# Patient Record
Sex: FEMALE | Race: WHITE | NOT HISPANIC OR LATINO
[De-identification: names, ages, dates, MRNs, and addresses within clinical notes are randomized per-mention and may not be internally consistent; named-entity substitution may affect disease eponyms.]

---

## 2017-01-03 ENCOUNTER — LABORATORY RESULT (OUTPATIENT)
Age: 77
End: 2017-01-03

## 2017-01-03 ENCOUNTER — APPOINTMENT (OUTPATIENT)
Dept: INTERNAL MEDICINE | Facility: CLINIC | Age: 77
End: 2017-01-03

## 2017-01-03 ENCOUNTER — NON-APPOINTMENT (OUTPATIENT)
Age: 77
End: 2017-01-03

## 2017-01-03 VITALS
HEART RATE: 66 BPM | SYSTOLIC BLOOD PRESSURE: 150 MMHG | DIASTOLIC BLOOD PRESSURE: 80 MMHG | RESPIRATION RATE: 16 BRPM | WEIGHT: 140 LBS | BODY MASS INDEX: 23.9 KG/M2 | HEIGHT: 64 IN

## 2017-01-03 DIAGNOSIS — Z84.89 FAMILY HISTORY OF OTHER SPECIFIED CONDITIONS: ICD-10-CM

## 2017-01-03 DIAGNOSIS — M15.9 POLYOSTEOARTHRITIS, UNSPECIFIED: ICD-10-CM

## 2017-01-06 ENCOUNTER — RX RENEWAL (OUTPATIENT)
Age: 77
End: 2017-01-06

## 2017-01-06 LAB
25(OH)D3 SERPL-MCNC: 29.6 NG/ML
ALBUMIN SERPL ELPH-MCNC: 4.3 G/DL
ALP BLD-CCNC: 69 U/L
ALT SERPL-CCNC: 16 U/L
ANION GAP SERPL CALC-SCNC: 19 MMOL/L
APPEARANCE: ABNORMAL
AST SERPL-CCNC: 23 U/L
BASOPHILS # BLD AUTO: 0.04 K/UL
BASOPHILS NFR BLD AUTO: 0.8 %
BILIRUB SERPL-MCNC: 0.6 MG/DL
BILIRUBIN URINE: NEGATIVE
BLOOD URINE: NEGATIVE
BUN SERPL-MCNC: 22 MG/DL
CALCIUM SERPL-MCNC: 9.4 MG/DL
CHLORIDE SERPL-SCNC: 103 MMOL/L
CHOLEST SERPL-MCNC: 266 MG/DL
CHOLEST/HDLC SERPL: 3 RATIO
CO2 SERPL-SCNC: 21 MMOL/L
COLOR: YELLOW
CREAT SERPL-MCNC: 0.67 MG/DL
EOSINOPHIL # BLD AUTO: 0.17 K/UL
EOSINOPHIL NFR BLD AUTO: 3.6 %
GLUCOSE QUALITATIVE U: NORMAL MG/DL
GLUCOSE SERPL-MCNC: 86 MG/DL
HCT VFR BLD CALC: 40.1 %
HDLC SERPL-MCNC: 89 MG/DL
HGB BLD-MCNC: 13.8 G/DL
IMM GRANULOCYTES NFR BLD AUTO: 0.4 %
KETONES URINE: NEGATIVE
LDLC SERPL CALC-MCNC: 159 MG/DL
LEUKOCYTE ESTERASE URINE: ABNORMAL
LYMPHOCYTES # BLD AUTO: 1.9 K/UL
LYMPHOCYTES NFR BLD AUTO: 39.8 %
MAN DIFF?: NORMAL
MCHC RBC-ENTMCNC: 32.7 PG
MCHC RBC-ENTMCNC: 34.4 GM/DL
MCV RBC AUTO: 95 FL
MONOCYTES # BLD AUTO: 0.47 K/UL
MONOCYTES NFR BLD AUTO: 9.9 %
NEUTROPHILS # BLD AUTO: 2.17 K/UL
NEUTROPHILS NFR BLD AUTO: 45.5 %
NITRITE URINE: NEGATIVE
PH URINE: 8
PLATELET # BLD AUTO: 187 K/UL
POTASSIUM SERPL-SCNC: 4.6 MMOL/L
PROT SERPL-MCNC: 6.8 G/DL
PROTEIN URINE: NEGATIVE MG/DL
RBC # BLD: 4.22 M/UL
RBC # FLD: 14.6 %
SODIUM SERPL-SCNC: 143 MMOL/L
SPECIFIC GRAVITY URINE: 1.02
T4 SERPL-MCNC: 6.1 UG/DL
TRIGL SERPL-MCNC: 91 MG/DL
TSH SERPL-ACNC: 5.48 UIU/ML
UROBILINOGEN URINE: NORMAL MG/DL
WBC # FLD AUTO: 4.77 K/UL

## 2017-01-06 RX ORDER — CHROMIUM 200 MCG
1000 TABLET ORAL DAILY
Qty: 90 | Refills: 1 | Status: ACTIVE | COMMUNITY
Start: 2017-01-06

## 2017-04-17 ENCOUNTER — NON-APPOINTMENT (OUTPATIENT)
Age: 77
End: 2017-04-17

## 2017-04-17 ENCOUNTER — APPOINTMENT (OUTPATIENT)
Dept: INTERNAL MEDICINE | Facility: CLINIC | Age: 77
End: 2017-04-17

## 2017-04-17 VITALS — DIASTOLIC BLOOD PRESSURE: 80 MMHG | SYSTOLIC BLOOD PRESSURE: 130 MMHG

## 2017-04-17 DIAGNOSIS — Z78.9 OTHER SPECIFIED HEALTH STATUS: ICD-10-CM

## 2017-04-17 RX ORDER — FEXOFENADINE HYDROCHLORIDE 180 MG/1
180 TABLET, FILM COATED ORAL DAILY
Refills: 0 | Status: ACTIVE | COMMUNITY
Start: 2017-04-17

## 2017-04-17 RX ORDER — OMEPRAZOLE MAGNESIUM 20 MG/1
20 TABLET, DELAYED RELEASE ORAL DAILY
Qty: 60 | Refills: 0 | Status: DISCONTINUED | COMMUNITY
Start: 2017-01-03 | End: 2017-04-17

## 2017-04-17 RX ORDER — ALPRAZOLAM 0.25 MG/1
0.25 TABLET ORAL
Qty: 60 | Refills: 0 | Status: ACTIVE | COMMUNITY
Start: 2017-04-17

## 2017-04-19 ENCOUNTER — RESULT REVIEW (OUTPATIENT)
Age: 77
End: 2017-04-19

## 2017-04-19 LAB
APPEARANCE: CLEAR
BACTERIA UR CULT: NORMAL
BACTERIA: NEGATIVE
BILIRUBIN URINE: NEGATIVE
BLOOD URINE: NEGATIVE
CALCIUM OXALATE CRYSTALS: ABNORMAL
COLOR: YELLOW
GLUCOSE QUALITATIVE U: NORMAL MG/DL
HYALINE CASTS: 0 /LPF
KETONES URINE: NEGATIVE
LEUKOCYTE ESTERASE URINE: ABNORMAL
MICROSCOPIC-UA: NORMAL
NITRITE URINE: NEGATIVE
PH URINE: 6
PROTEIN URINE: NEGATIVE MG/DL
RED BLOOD CELLS URINE: 1 /HPF
SPECIFIC GRAVITY URINE: 1.02
SQUAMOUS EPITHELIAL CELLS: 3 /HPF
UROBILINOGEN URINE: NORMAL MG/DL
WHITE BLOOD CELLS URINE: 7 /HPF

## 2017-05-14 ENCOUNTER — EMERGENCY (EMERGENCY)
Facility: HOSPITAL | Age: 77
LOS: 1 days | Discharge: HOME | End: 2017-05-14
Attending: EMERGENCY MEDICINE | Admitting: EMERGENCY MEDICINE
Payer: MEDICARE

## 2017-05-14 VITALS
RESPIRATION RATE: 18 BRPM | HEART RATE: 70 BPM | TEMPERATURE: 98 F | DIASTOLIC BLOOD PRESSURE: 74 MMHG | OXYGEN SATURATION: 97 % | SYSTOLIC BLOOD PRESSURE: 150 MMHG

## 2017-05-14 VITALS
TEMPERATURE: 98 F | RESPIRATION RATE: 18 BRPM | OXYGEN SATURATION: 100 % | SYSTOLIC BLOOD PRESSURE: 167 MMHG | DIASTOLIC BLOOD PRESSURE: 90 MMHG | HEART RATE: 62 BPM

## 2017-05-14 PROCEDURE — 73562 X-RAY EXAM OF KNEE 3: CPT | Mod: 26,RT

## 2017-05-14 PROCEDURE — 99285 EMERGENCY DEPT VISIT HI MDM: CPT | Mod: GC

## 2017-05-14 RX ORDER — MORPHINE SULFATE 50 MG/1
4 CAPSULE, EXTENDED RELEASE ORAL ONCE
Qty: 0 | Refills: 0 | Status: DISCONTINUED | OUTPATIENT
Start: 2017-05-14 | End: 2017-05-14

## 2017-05-14 RX ADMIN — MORPHINE SULFATE 4 MILLIGRAM(S): 50 CAPSULE, EXTENDED RELEASE ORAL at 22:32

## 2017-05-14 NOTE — ED ADULT TRIAGE NOTE - CHIEF COMPLAINT QUOTE
pt BIBA from home, pt c/o right knee pain s/p sliding out of her chair.  pt is s/p right knee replacement 4/27/17.

## 2017-05-14 NOTE — ED ADULT NURSE NOTE - OBJECTIVE STATEMENT
Alert and oriented x 4. Pt received to spot 7 complaining of right knee pain and swelling. Pt states," I had right knee replacement on April 27,2017.Everything was fine. Today while moving from the table, I felt a tear and stabbing pain." Pain 7/10, no redness or drainage. Pt Knee is swollen. VSS.  at bedside. RN facilitator. Will continue to monitor.

## 2017-05-14 NOTE — ED PROVIDER NOTE - PHYSICAL EXAMINATION
VS:  unremarkable    GEN -mod distress R knee pain A+O x3   HEAD - NC/AT     ENT - PEERL, EOMI, mucous membranes  moist , no discharge      NECK: Neck supple, non-tender without lymphadenopathy, no masses, no JVD  PULM - CTA b/l,  symmetric breath sounds  COR -  normal heart sounds    ABD - , ND, NT, soft, no guarding, no rebound, no masses    BACK - no CVA tenderness, nontender spine     EXTREMS - no edema, no deformity, warm and well perfused; except R knee - R knee large saggital incision c/d/i.  Swelling and ttp diffusely over anterior knee - R knee effusion mod - large.  Hip and foot/ankle nontender.  R knee decr ROM due to pain.  No pus discharge.  Distal N/V/T/I.  SKIN - no rash or bruising      NEUROLOGIC - alert, CN 2-12 intact, sensation nl, motor 5/5 RUE/LUE/RLE/LLE.      IMP:

## 2017-05-14 NOTE — ED PROVIDER NOTE - OBJECTIVE STATEMENT
76F p/w sudden severe R knee pain x 1 day. Pt was seated in a chair and scooted it forward with her knee bent and planted on the floor and then felt a sudden severe pain in the anterior R knee.  No direct blow or fall.  Now unable to walk on it.  Swelling and redness to knee has increased since the injury today.  It was only mildly swollen before.  Pt had been able to walk and put weight on it prior to the event, was doing PT.  PT had TKR 4/27/17.  No hip pain, no foot pain, no fever.  Took Oxycodone 4 h ago.  Pmhx:  GERD, anxiety, Allergies  PShx:  shoulder replacement, appx  no T  PCN intolerance (diarrhea 40 ya), no allergies.

## 2017-05-14 NOTE — ED PROVIDER NOTE - MEDICAL DECISION MAKING DETAILS
76F p/w sudden severe R knee pain while it was flexed and scooting with the chair.  Possible periprosthetic fracture.  Ortho at [facility] Jose F Priest.  Will rx pain meds and check preop - labs, check R knee xray.  To be discussed with her ortho.

## 2017-05-14 NOTE — ED PROVIDER NOTE - PROGRESS NOTE DETAILS
Urbano CARBAJAL: Paged Dr. Jose F Priest via answering service at [phone] and [phone], still awaiting callback. Urbano CARBAJAL: D/w Dr. Priest, states he has very low suspicion for hardware loosening, he was concerned for quad tendon or patellar tendon rupture but given that pt can now straight leg raise, pt can go home, knee immobilizer for comfort, and he can see pt in office on Monday or Wed.

## 2017-05-14 NOTE — ED PROVIDER NOTE - PSH
Fracture  repair of right knee with pinning  H/O abdominoplasty    H/O bilateral breast reduction surgery    H/O shoulder replacement  right 6/2012  S/P appendectomy    S/P cataract extraction    S/P tonsillectomy

## 2017-05-15 RX ORDER — OXYCODONE HYDROCHLORIDE 5 MG/1
10 TABLET ORAL ONCE
Qty: 0 | Refills: 0 | Status: DISCONTINUED | OUTPATIENT
Start: 2017-05-15 | End: 2017-05-15

## 2017-05-15 RX ORDER — MORPHINE SULFATE 50 MG/1
4 CAPSULE, EXTENDED RELEASE ORAL ONCE
Qty: 0 | Refills: 0 | Status: DISCONTINUED | OUTPATIENT
Start: 2017-05-15 | End: 2017-05-15

## 2017-05-15 RX ADMIN — MORPHINE SULFATE 4 MILLIGRAM(S): 50 CAPSULE, EXTENDED RELEASE ORAL at 00:24

## 2017-05-15 RX ADMIN — OXYCODONE HYDROCHLORIDE 10 MILLIGRAM(S): 5 TABLET ORAL at 01:08

## 2017-05-15 RX ADMIN — MORPHINE SULFATE 4 MILLIGRAM(S): 50 CAPSULE, EXTENDED RELEASE ORAL at 00:48

## 2017-05-15 RX ADMIN — MORPHINE SULFATE 4 MILLIGRAM(S): 50 CAPSULE, EXTENDED RELEASE ORAL at 00:19

## 2017-07-25 ENCOUNTER — APPOINTMENT (OUTPATIENT)
Dept: INTERNAL MEDICINE | Facility: CLINIC | Age: 77
End: 2017-07-25

## 2018-04-10 ENCOUNTER — LABORATORY RESULT (OUTPATIENT)
Age: 78
End: 2018-04-10

## 2018-04-10 ENCOUNTER — NON-APPOINTMENT (OUTPATIENT)
Age: 78
End: 2018-04-10

## 2018-04-10 ENCOUNTER — APPOINTMENT (OUTPATIENT)
Dept: INTERNAL MEDICINE | Facility: CLINIC | Age: 78
End: 2018-04-10
Payer: MEDICARE

## 2018-04-10 VITALS
DIASTOLIC BLOOD PRESSURE: 80 MMHG | BODY MASS INDEX: 24.8 KG/M2 | SYSTOLIC BLOOD PRESSURE: 120 MMHG | WEIGHT: 140 LBS | RESPIRATION RATE: 16 BRPM | HEART RATE: 72 BPM | HEIGHT: 63 IN

## 2018-04-10 DIAGNOSIS — Z78.9 OTHER SPECIFIED HEALTH STATUS: ICD-10-CM

## 2018-04-10 PROCEDURE — 99497 ADVNCD CARE PLAN 30 MIN: CPT | Mod: 33

## 2018-04-10 PROCEDURE — 36415 COLL VENOUS BLD VENIPUNCTURE: CPT

## 2018-04-10 PROCEDURE — 93000 ELECTROCARDIOGRAM COMPLETE: CPT

## 2018-04-10 PROCEDURE — G0439: CPT

## 2018-04-10 RX ORDER — VALACYCLOVIR HYDROCHLORIDE 500 MG/1
500 TABLET, FILM COATED ORAL DAILY
Refills: 0 | Status: ACTIVE | COMMUNITY
Start: 2018-04-10

## 2018-04-12 ENCOUNTER — TRANSCRIPTION ENCOUNTER (OUTPATIENT)
Age: 78
End: 2018-04-12

## 2018-04-12 LAB
25(OH)D3 SERPL-MCNC: 41.2 NG/ML
ALBUMIN SERPL ELPH-MCNC: 4.2 G/DL
ALP BLD-CCNC: 71 U/L
ALT SERPL-CCNC: 20 U/L
ANION GAP SERPL CALC-SCNC: 13 MMOL/L
APPEARANCE: ABNORMAL
APTT BLD: 35.6 SEC
AST SERPL-CCNC: 22 U/L
BASOPHILS # BLD AUTO: 0.03 K/UL
BASOPHILS NFR BLD AUTO: 0.6 %
BILIRUB SERPL-MCNC: 0.5 MG/DL
BILIRUBIN URINE: NEGATIVE
BLOOD URINE: NEGATIVE
BUN SERPL-MCNC: 28 MG/DL
CALCIUM SERPL-MCNC: 9.9 MG/DL
CHLORIDE SERPL-SCNC: 103 MMOL/L
CHOLEST SERPL-MCNC: 242 MG/DL
CHOLEST/HDLC SERPL: 3.4 RATIO
CO2 SERPL-SCNC: 26 MMOL/L
COLOR: YELLOW
CREAT SERPL-MCNC: 0.75 MG/DL
CRP SERPL-MCNC: 0.2 MG/DL
EOSINOPHIL # BLD AUTO: 0.17 K/UL
EOSINOPHIL NFR BLD AUTO: 3.3 %
ERYTHROCYTE [SEDIMENTATION RATE] IN BLOOD BY WESTERGREN METHOD: 8 MM/HR
GLUCOSE QUALITATIVE U: NEGATIVE MG/DL
GLUCOSE SERPL-MCNC: 88 MG/DL
HCT VFR BLD CALC: 41.7 %
HDLC SERPL-MCNC: 71 MG/DL
HGB BLD-MCNC: 14.6 G/DL
IMM GRANULOCYTES NFR BLD AUTO: 0.2 %
INR PPP: 1.04 RATIO
KETONES URINE: ABNORMAL
LDLC SERPL CALC-MCNC: 136 MG/DL
LEUKOCYTE ESTERASE URINE: ABNORMAL
LYMPHOCYTES # BLD AUTO: 2.05 K/UL
LYMPHOCYTES NFR BLD AUTO: 40 %
MAN DIFF?: NORMAL
MCHC RBC-ENTMCNC: 33.7 PG
MCHC RBC-ENTMCNC: 35 GM/DL
MCV RBC AUTO: 96.3 FL
MONOCYTES # BLD AUTO: 0.39 K/UL
MONOCYTES NFR BLD AUTO: 7.6 %
NEUTROPHILS # BLD AUTO: 2.47 K/UL
NEUTROPHILS NFR BLD AUTO: 48.3 %
NITRITE URINE: NEGATIVE
PH URINE: 6
PLATELET # BLD AUTO: 229 K/UL
POTASSIUM SERPL-SCNC: 4.8 MMOL/L
PROT SERPL-MCNC: 7.3 G/DL
PROTEIN URINE: NEGATIVE MG/DL
PT BLD: 11.8 SEC
RBC # BLD: 4.33 M/UL
RBC # FLD: 13.3 %
SODIUM SERPL-SCNC: 142 MMOL/L
SPECIFIC GRAVITY URINE: 1.02
T4 SERPL-MCNC: 5.7 UG/DL
TRIGL SERPL-MCNC: 176 MG/DL
TSH SERPL-ACNC: 4 UIU/ML
UROBILINOGEN URINE: NEGATIVE MG/DL
WBC # FLD AUTO: 5.12 K/UL

## 2018-05-22 ENCOUNTER — NON-APPOINTMENT (OUTPATIENT)
Age: 78
End: 2018-05-22

## 2018-05-22 ENCOUNTER — APPOINTMENT (OUTPATIENT)
Dept: INTERNAL MEDICINE | Facility: CLINIC | Age: 78
End: 2018-05-22
Payer: MEDICARE

## 2018-05-22 VITALS
RESPIRATION RATE: 16 BRPM | HEART RATE: 76 BPM | WEIGHT: 149 LBS | BODY MASS INDEX: 26.39 KG/M2 | DIASTOLIC BLOOD PRESSURE: 82 MMHG | SYSTOLIC BLOOD PRESSURE: 120 MMHG

## 2018-05-22 PROCEDURE — 93000 ELECTROCARDIOGRAM COMPLETE: CPT

## 2018-05-22 PROCEDURE — 99214 OFFICE O/P EST MOD 30 MIN: CPT | Mod: 25

## 2018-05-22 PROCEDURE — 36415 COLL VENOUS BLD VENIPUNCTURE: CPT

## 2018-05-22 NOTE — HISTORY OF PRESENT ILLNESS
[( Patient denies any chest pain, claudication, dyspnea on exertion, orthopnea, palpitations or syncope )] : Patient denies any chest pain, claudication, dyspnea on exertion, orthopnea, palpitations or syncope. [Coronary Artery Disease] : no coronary artery disease [Diabetes] : no diabetes [Sleep Apnea] : no sleep apnea [COPD] : no COPD [Previous Adverse Anesthesia Reaction] : no previous adverse anesthesia reaction [FreeTextEntry1] : Left shoulder replacement [FreeTextEntry2] : June 13, 2018 [FreeTextEntry3] : Dr. Abhay Graham

## 2018-05-22 NOTE — ASSESSMENT
[Patient Optimized for Surgery] : Patient optimized for surgery [No Further Testing Recommended] : no further testing recommended [FreeTextEntry4] : Pt is an acceptable candidate for planned surgery.\par Continue meds post-op

## 2018-05-22 NOTE — CONSULT LETTER
[Dear  ___] : Dear  [unfilled], [Consult Letter:] : I had the pleasure of evaluating your patient, [unfilled]. [Please see my note below.] : Please see my note below. [Consult Closing:] : Thank you very much for allowing me to participate in the care of this patient.  If you have any questions, please do not hesitate to contact me. [Sincerely,] : Sincerely, [FreeTextEntry1] : Pre-Op Evaluation [FreeTextEntry3] : Cristino Bernard MD

## 2018-05-22 NOTE — PHYSICAL EXAM
[No Acute Distress] : no acute distress [Well Nourished] : well nourished [Well Developed] : well developed [Well-Appearing] : well-appearing [Normal Sclera/Conjunctiva] : normal sclera/conjunctiva [PERRL] : pupils equal round and reactive to light [Normal Oropharynx] : the oropharynx was normal [No JVD] : no jugular venous distention [Supple] : supple [No Lymphadenopathy] : no lymphadenopathy [Thyroid Normal, No Nodules] : the thyroid was normal and there were no nodules present [No Respiratory Distress] : no respiratory distress  [Clear to Auscultation] : lungs were clear to auscultation bilaterally [No Accessory Muscle Use] : no accessory muscle use [Normal Rate] : normal rate  [Regular Rhythm] : with a regular rhythm [Normal S1, S2] : normal S1 and S2 [No Murmur] : no murmur heard [No Carotid Bruits] : no carotid bruits [No Edema] : there was no peripheral edema [Soft] : abdomen soft [Non Tender] : non-tender [Non-distended] : non-distended [No Masses] : no abdominal mass palpated [No HSM] : no HSM [Normal Bowel Sounds] : normal bowel sounds [Normal Supraclavicular Nodes] : no supraclavicular lymphadenopathy [Normal Posterior Cervical Nodes] : no posterior cervical lymphadenopathy [Normal Anterior Cervical Nodes] : no anterior cervical lymphadenopathy [No CVA Tenderness] : no CVA  tenderness [No Spinal Tenderness] : no spinal tenderness [No Focal Deficits] : no focal deficits [Alert and Oriented x3] : oriented to person, place, and time

## 2018-05-24 LAB
ALBUMIN SERPL ELPH-MCNC: 4.2 G/DL
ALP BLD-CCNC: 74 U/L
ALT SERPL-CCNC: 20 U/L
ANION GAP SERPL CALC-SCNC: 10 MMOL/L
APTT BLD: 31.5 SEC
AST SERPL-CCNC: 23 U/L
BASOPHILS # BLD AUTO: 0.04 K/UL
BASOPHILS NFR BLD AUTO: 0.6 %
BILIRUB SERPL-MCNC: 0.6 MG/DL
BUN SERPL-MCNC: 22 MG/DL
CALCIUM SERPL-MCNC: 9.4 MG/DL
CHLORIDE SERPL-SCNC: 101 MMOL/L
CO2 SERPL-SCNC: 28 MMOL/L
CREAT SERPL-MCNC: 0.66 MG/DL
CRP SERPL-MCNC: 0.2 MG/DL
EOSINOPHIL # BLD AUTO: 0.13 K/UL
EOSINOPHIL NFR BLD AUTO: 2.1 %
ERYTHROCYTE [SEDIMENTATION RATE] IN BLOOD BY WESTERGREN METHOD: 13 MM/HR
GLUCOSE SERPL-MCNC: 84 MG/DL
HCT VFR BLD CALC: 42 %
HGB BLD-MCNC: 14.2 G/DL
IMM GRANULOCYTES NFR BLD AUTO: 0.2 %
INR PPP: 0.99 RATIO
LYMPHOCYTES # BLD AUTO: 2.15 K/UL
LYMPHOCYTES NFR BLD AUTO: 34.9 %
MAN DIFF?: NORMAL
MCHC RBC-ENTMCNC: 32.6 PG
MCHC RBC-ENTMCNC: 33.8 GM/DL
MCV RBC AUTO: 96.6 FL
MONOCYTES # BLD AUTO: 0.53 K/UL
MONOCYTES NFR BLD AUTO: 8.6 %
NEUTROPHILS # BLD AUTO: 3.3 K/UL
NEUTROPHILS NFR BLD AUTO: 53.6 %
PLATELET # BLD AUTO: 189 K/UL
POTASSIUM SERPL-SCNC: 4.3 MMOL/L
PROT SERPL-MCNC: 6.9 G/DL
PT BLD: 11.2 SEC
RBC # BLD: 4.35 M/UL
RBC # FLD: 13.1 %
SODIUM SERPL-SCNC: 139 MMOL/L
WBC # FLD AUTO: 6.16 K/UL

## 2018-05-25 ENCOUNTER — TRANSCRIPTION ENCOUNTER (OUTPATIENT)
Age: 78
End: 2018-05-25

## 2018-11-01 ENCOUNTER — NON-APPOINTMENT (OUTPATIENT)
Age: 78
End: 2018-11-01

## 2018-11-01 ENCOUNTER — APPOINTMENT (OUTPATIENT)
Dept: CARDIOLOGY | Facility: CLINIC | Age: 78
End: 2018-11-01
Payer: MEDICARE

## 2018-11-01 VITALS
SYSTOLIC BLOOD PRESSURE: 138 MMHG | BODY MASS INDEX: 26.39 KG/M2 | WEIGHT: 149 LBS | DIASTOLIC BLOOD PRESSURE: 90 MMHG | OXYGEN SATURATION: 97 % | HEART RATE: 71 BPM

## 2018-11-01 VITALS — DIASTOLIC BLOOD PRESSURE: 80 MMHG | SYSTOLIC BLOOD PRESSURE: 130 MMHG

## 2018-11-01 PROCEDURE — 93000 ELECTROCARDIOGRAM COMPLETE: CPT

## 2018-11-01 PROCEDURE — 99204 OFFICE O/P NEW MOD 45 MIN: CPT

## 2018-11-08 ENCOUNTER — APPOINTMENT (OUTPATIENT)
Dept: CARDIOLOGY | Facility: CLINIC | Age: 78
End: 2018-11-08

## 2018-11-16 ENCOUNTER — APPOINTMENT (OUTPATIENT)
Dept: CARDIOLOGY | Facility: CLINIC | Age: 78
End: 2018-11-16
Payer: MEDICARE

## 2018-11-16 PROCEDURE — 93320 DOPPLER ECHO COMPLETE: CPT

## 2018-11-16 PROCEDURE — 93351 STRESS TTE COMPLETE: CPT

## 2018-11-16 PROCEDURE — 93325 DOPPLER ECHO COLOR FLOW MAPG: CPT

## 2019-04-23 ENCOUNTER — APPOINTMENT (OUTPATIENT)
Dept: INTERNAL MEDICINE | Facility: CLINIC | Age: 79
End: 2019-04-23
Payer: MEDICARE

## 2019-04-23 ENCOUNTER — NON-APPOINTMENT (OUTPATIENT)
Age: 79
End: 2019-04-23

## 2019-04-23 VITALS
WEIGHT: 153 LBS | HEART RATE: 70 BPM | RESPIRATION RATE: 16 BRPM | BODY MASS INDEX: 27.11 KG/M2 | HEIGHT: 63 IN | DIASTOLIC BLOOD PRESSURE: 90 MMHG | SYSTOLIC BLOOD PRESSURE: 130 MMHG

## 2019-04-23 PROCEDURE — 93000 ELECTROCARDIOGRAM COMPLETE: CPT | Mod: 59

## 2019-04-23 PROCEDURE — 36415 COLL VENOUS BLD VENIPUNCTURE: CPT

## 2019-04-23 PROCEDURE — 99497 ADVNCD CARE PLAN 30 MIN: CPT | Mod: 33

## 2019-04-23 PROCEDURE — G0439: CPT

## 2019-04-23 PROCEDURE — 99213 OFFICE O/P EST LOW 20 MIN: CPT | Mod: 25

## 2019-04-23 NOTE — REVIEW OF SYSTEMS
[Hearing Loss] : hearing loss [Negative] : Heme/Lymph [FreeTextEntry3] : last optho - 2018 [de-identified] : sees psych [de-identified] : sees derm [FreeTextEntry9] : BENNY

## 2019-04-23 NOTE — HEALTH RISK ASSESSMENT
[Good] : ~his/her~  mood as  good [No falls in past year] : Patient reported no falls in the past year [With Family] : lives with family [None] : None [Retired] : retired [Fully functional (bathing, dressing, toileting, transferring, walking, feeding)] : Fully functional (bathing, dressing, toileting, transferring, walking, feeding) [] :  [Feels Safe at Home] : Feels safe at home [Carbon Monoxide Detector] : carbon monoxide detector [Fully functional (using the telephone, shopping, preparing meals, housekeeping, doing laundry, using] : Fully functional and needs no help or supervision to perform IADLs (using the telephone, shopping, preparing meals, housekeeping, doing laundry, using transportation, managing medications and managing finances) [Smoke Detector] : smoke detector [Sunscreen] : uses sunscreen [Seat Belt] :  uses seat belt [With Patient/Caregiver] : With Patient/Caregiver [Designated Healthcare Proxy] : Designated healthcare proxy [Name: ___] : Health Care Proxy's Name: [unfilled]  [] : No [de-identified] : Socially [AdvancecareDate] : 04/19 [de-identified] : GYM 1-2 x per week [FreeTextEntry4] : Had long discussion with the patient.\par Discussed with pt the need for a health care proxy.\par Discussed who can be a proxy, forms given if needed, and the need for the proxy to know their wishes and to make sure they will comply.\par The proxy will need to have a copy in their home and the patient should have a copy.\par \par

## 2019-04-23 NOTE — HISTORY OF PRESENT ILLNESS
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

## 2019-04-23 NOTE — ASSESSMENT
[FreeTextEntry1] : Pt with above medical issues which requires extra work in addition to the well visit.\par Labs sent\par Meds to be adjusted\par To watch salt in diet as BP borderline.\par Health counseling given.

## 2019-04-23 NOTE — PHYSICAL EXAM
[No Acute Distress] : no acute distress [Well Nourished] : well nourished [Well Developed] : well developed [Well-Appearing] : well-appearing [Normal Sclera/Conjunctiva] : normal sclera/conjunctiva [PERRL] : pupils equal round and reactive to light [EOMI] : extraocular movements intact [Normal Outer Ear/Nose] : the outer ears and nose were normal in appearance [Normal Oropharynx] : the oropharynx was normal [Normal TMs] : both tympanic membranes were normal [No JVD] : no jugular venous distention [Supple] : supple [Thyroid Normal, No Nodules] : the thyroid was normal and there were no nodules present [No Lymphadenopathy] : no lymphadenopathy [No Respiratory Distress] : no respiratory distress  [Clear to Auscultation] : lungs were clear to auscultation bilaterally [No Accessory Muscle Use] : no accessory muscle use [Normal S1, S2] : normal S1 and S2 [Regular Rhythm] : with a regular rhythm [Normal Rate] : normal rate  [No Carotid Bruits] : no carotid bruits [No Murmur] : no murmur heard [Pedal Pulses Present] : the pedal pulses are present [No Varicosities] : no varicosities [No Abdominal Bruit] : a ~M bruit was not heard ~T in the abdomen [No Extremity Clubbing/Cyanosis] : no extremity clubbing/cyanosis [No Edema] : there was no peripheral edema [No Nipple Discharge] : no nipple discharge [No Palpable Aorta] : no palpable aorta [No Axillary Lymphadenopathy] : no axillary lymphadenopathy [Soft] : abdomen soft [Non Tender] : non-tender [Non-distended] : non-distended [No Masses] : no abdominal mass palpated [No HSM] : no HSM [Normal Bowel Sounds] : normal bowel sounds [Normal Axillary Nodes] : no axillary lymphadenopathy [Normal Supraclavicular Nodes] : no supraclavicular lymphadenopathy [No CVA Tenderness] : no CVA  tenderness [Normal Posterior Cervical Nodes] : no posterior cervical lymphadenopathy [Normal Anterior Cervical Nodes] : no anterior cervical lymphadenopathy [No Spinal Tenderness] : no spinal tenderness [Grossly Normal Strength/Tone] : grossly normal strength/tone [No Joint Swelling] : no joint swelling [Normal Gait] : normal gait [No Rash] : no rash [Coordination Grossly Intact] : coordination grossly intact [No Focal Deficits] : no focal deficits [Speech Grossly Normal] : speech grossly normal [Deep Tendon Reflexes (DTR)] : deep tendon reflexes were 2+ and symmetric [Memory Grossly Normal] : memory grossly normal [Normal Affect] : the affect was normal [Alert and Oriented x3] : oriented to person, place, and time [Normal Mood] : the mood was normal [Normal Insight/Judgement] : insight and judgment were intact [de-identified] : SP bilateral reduction [FreeTextEntry1] : Deferred to GYN [de-identified] : Deferred to GYN

## 2019-04-25 LAB
25(OH)D3 SERPL-MCNC: 37.4 NG/ML
ALBUMIN SERPL ELPH-MCNC: 4.4 G/DL
ALP BLD-CCNC: 76 U/L
ALT SERPL-CCNC: 21 U/L
ANION GAP SERPL CALC-SCNC: 12 MMOL/L
APPEARANCE: CLEAR
AST SERPL-CCNC: 18 U/L
BASOPHILS # BLD AUTO: 0.04 K/UL
BASOPHILS NFR BLD AUTO: 0.7 %
BILIRUB SERPL-MCNC: 0.5 MG/DL
BILIRUBIN URINE: NEGATIVE
BLOOD URINE: NEGATIVE
BUN SERPL-MCNC: 23 MG/DL
CALCIUM SERPL-MCNC: 9.6 MG/DL
CHLORIDE SERPL-SCNC: 101 MMOL/L
CHOLEST SERPL-MCNC: 234 MG/DL
CHOLEST/HDLC SERPL: 3.3 RATIO
CO2 SERPL-SCNC: 28 MMOL/L
COLOR: NORMAL
CREAT SERPL-MCNC: 0.58 MG/DL
EOSINOPHIL # BLD AUTO: 0.12 K/UL
EOSINOPHIL NFR BLD AUTO: 2 %
ESTIMATED AVERAGE GLUCOSE: 94 MG/DL
GLUCOSE QUALITATIVE U: NEGATIVE
GLUCOSE SERPL-MCNC: 87 MG/DL
HBA1C MFR BLD HPLC: 4.9 %
HCT VFR BLD CALC: 42.8 %
HDLC SERPL-MCNC: 70 MG/DL
HGB BLD-MCNC: 14.5 G/DL
IMM GRANULOCYTES NFR BLD AUTO: 0.2 %
KETONES URINE: NEGATIVE
LDLC SERPL CALC-MCNC: 142 MG/DL
LEUKOCYTE ESTERASE URINE: NEGATIVE
LYMPHOCYTES # BLD AUTO: 2.11 K/UL
LYMPHOCYTES NFR BLD AUTO: 35.7 %
MAN DIFF?: NORMAL
MCHC RBC-ENTMCNC: 32.5 PG
MCHC RBC-ENTMCNC: 33.9 GM/DL
MCV RBC AUTO: 96 FL
MONOCYTES # BLD AUTO: 0.48 K/UL
MONOCYTES NFR BLD AUTO: 8.1 %
NEUTROPHILS # BLD AUTO: 3.15 K/UL
NEUTROPHILS NFR BLD AUTO: 53.3 %
NITRITE URINE: NEGATIVE
PH URINE: 7
PLATELET # BLD AUTO: 207 K/UL
POTASSIUM SERPL-SCNC: 4.5 MMOL/L
PROT SERPL-MCNC: 6.8 G/DL
PROTEIN URINE: NEGATIVE
RBC # BLD: 4.46 M/UL
RBC # FLD: 12.9 %
SODIUM SERPL-SCNC: 140 MMOL/L
SPECIFIC GRAVITY URINE: 1.01
T4 SERPL-MCNC: 6.2 UG/DL
TRIGL SERPL-MCNC: 111 MG/DL
TSH SERPL-ACNC: 6.05 UIU/ML
UROBILINOGEN URINE: NORMAL
VIT B12 SERPL-MCNC: 965 PG/ML
WBC # FLD AUTO: 5.91 K/UL

## 2019-05-10 ENCOUNTER — APPOINTMENT (OUTPATIENT)
Dept: UROLOGY | Facility: CLINIC | Age: 79
End: 2019-05-10
Payer: MEDICARE

## 2019-05-10 VITALS
SYSTOLIC BLOOD PRESSURE: 136 MMHG | WEIGHT: 148 LBS | HEIGHT: 63.5 IN | BODY MASS INDEX: 25.9 KG/M2 | RESPIRATION RATE: 16 BRPM | HEART RATE: 76 BPM | DIASTOLIC BLOOD PRESSURE: 90 MMHG

## 2019-05-10 DIAGNOSIS — Z84.1 FAMILY HISTORY OF DISORDERS OF KIDNEY AND URETER: ICD-10-CM

## 2019-05-10 PROCEDURE — 99204 OFFICE O/P NEW MOD 45 MIN: CPT

## 2019-05-10 NOTE — REVIEW OF SYSTEMS
[Recent Weight Loss (___ Lbs)] : recent [unfilled] ~Ulb weight loss [Date of last menstrual period ____] : date of last menstrual period: [unfilled] [Sexually Transmitted Disease (STD)] : sexually transmitted disease [Urine retention] : urine retention [Seen by urologist before (Name)  ___] : Previously seen by a urologist: [unfilled] [Wake up at night to urinate  How many times?  ___] : wakes up to urinate [unfilled] times during the night [Strong urge to urinate] : strong urge to urinate [Bladder pressure] : experiences bladder pressure [Strain or push to urinate] : strain or push to urinate [Bladder fullness after urinating] : bladder fullness after urinating [Negative] : Endocrine [FreeTextEntry1] : herpes [FreeTextEntry6] : frequent urination 10-12 times

## 2019-05-10 NOTE — PHYSICAL EXAM
[General Appearance - Well Developed] : well developed [General Appearance - Well Nourished] : well nourished [General Appearance - In No Acute Distress] : no acute distress [Edema] : no peripheral edema [Exaggerated Use Of Accessory Muscles For Inspiration] : no accessory muscle use [Abdomen Soft] : soft [Urethral Meatus] : normal urethra [Urinary Bladder Findings] : the bladder was normal on palpation [External Female Genitalia] : normal external genitalia [Vagina] : normal vaginal exam [Normal Station and Gait] : the gait and station were normal for the patient's age [Skin Color & Pigmentation] : normal skin color and pigmentation [No Focal Deficits] : no focal deficits [Oriented To Time, Place, And Person] : oriented to person, place, and time [Cervical Lymph Nodes Enlarged Anterior Bilaterally] : anterior cervical [Supraclavicular Lymph Nodes Enlarged Bilaterally] : supraclavicular [FreeTextEntry1] : mod atrophy,

## 2019-05-10 NOTE — ASSESSMENT
[FreeTextEntry1] : --Renal US to eval for stone. \par --oxybutynin\par --caffeinie limit\par --rtc in 1mo

## 2019-05-10 NOTE — HISTORY OF PRESENT ILLNESS
[FreeTextEntry1] : 79yo female with cc of urinary frequency. SHe reports bothersome urinary frequency both day and night. SHe is going 20 times she states in the day. SHe will do some readjusting and get a little more out. This occurs more at night. Nocturia x3 (can't go more than 2y). SHe endorses urinary urgency. Occasional urinary urgency. Wears a pad for safety. SHe does not leak daily. No straining. Occasional feelings of incomplete emptying. No issues with UTIs. \par \par Drinks a lot of water and ensure. Decaf coffee in the am and 3x per week has 2 cups of coffee playing bridge. No sensation of prolapse. Occasional issues with constipation. \par \par Pt had been seen by  in the past and reports she was told she had "a stone in ureter". SHe had lithotripsy and had her sx resolve. Suspect this was ureteral stone. SHe did not have pain. No other hx of stones.

## 2019-05-10 NOTE — HISTORY OF PRESENT ILLNESS
[FreeTextEntry1] : 77yo female with cc of urinary frequency. SHe reports bothersome urinary frequency both day and night. SHe is going 20 times she states in the day. SHe will do some readjusting and get a little more out. This occurs more at night. Nocturia x3 (can't go more than 2y). SHe endorses urinary urgency. Occasional urinary urgency. Wears a pad for safety. SHe does not leak daily. No straining. Occasional feelings of incomplete emptying. No issues with UTIs. \par \par Drinks a lot of water and ensure. Decaf coffee in the am and 3x per week has 2 cups of coffee playing bridge. No sensation of prolapse. Occasional issues with constipation. \par \par Pt had been seen by  in the past and reports she was told she had "a stone in ureter". SHe had lithotripsy and had her sx resolve. Suspect this was ureteral stone. SHe did not have pain. No other hx of stones.

## 2019-05-14 ENCOUNTER — APPOINTMENT (OUTPATIENT)
Dept: CARDIOLOGY | Facility: CLINIC | Age: 79
End: 2019-05-14
Payer: MEDICARE

## 2019-05-14 VITALS — SYSTOLIC BLOOD PRESSURE: 135 MMHG | DIASTOLIC BLOOD PRESSURE: 85 MMHG

## 2019-05-14 VITALS
HEART RATE: 62 BPM | WEIGHT: 151 LBS | TEMPERATURE: 98.4 F | BODY MASS INDEX: 26.75 KG/M2 | OXYGEN SATURATION: 97 % | DIASTOLIC BLOOD PRESSURE: 111 MMHG | SYSTOLIC BLOOD PRESSURE: 163 MMHG | HEIGHT: 63 IN

## 2019-05-14 PROCEDURE — 99214 OFFICE O/P EST MOD 30 MIN: CPT

## 2019-05-14 NOTE — HISTORY OF PRESENT ILLNESS
[FreeTextEntry1] : 78-year-old female who was seen last fall for atypical chest pain and dyspnea on exertion. Her physical was unremarkable.  She has felt generally well over the winter and is no longer having exertional complaints. She continues to struggle with her weight.

## 2019-05-14 NOTE — PHYSICAL EXAM
[General Appearance - Well Developed] : well developed [Normal Appearance] : normal appearance [Well Groomed] : well groomed [No Deformities] : no deformities [General Appearance - Well Nourished] : well nourished [General Appearance - In No Acute Distress] : no acute distress [Eyelids - No Xanthelasma] : the eyelids demonstrated no xanthelasmas [Normal Conjunctiva] : the conjunctiva exhibited no abnormalities [Normal Oral Mucosa] : normal oral mucosa [No Oral Pallor] : no oral pallor [No Oral Cyanosis] : no oral cyanosis [Normal Jugular Venous A Waves Present] : normal jugular venous A waves present [Normal Jugular Venous V Waves Present] : normal jugular venous V waves present [No Jugular Venous Tenorio A Waves] : no jugular venous tenorio A waves [Exaggerated Use Of Accessory Muscles For Inspiration] : no accessory muscle use [Respiration, Rhythm And Depth] : normal respiratory rhythm and effort [Auscultation Breath Sounds / Voice Sounds] : lungs were clear to auscultation bilaterally [Heart Sounds] : normal S1 and S2 [Heart Rate And Rhythm] : heart rate and rhythm were normal [Murmurs] : no murmurs present [Abdomen Soft] : soft [Abdomen Tenderness] : non-tender [Abdomen Mass (___ Cm)] : no abdominal mass palpated [Abnormal Walk] : normal gait [Gait - Sufficient For Exercise Testing] : the gait was sufficient for exercise testing [Cyanosis, Localized] : no localized cyanosis [Nail Clubbing] : no clubbing of the fingernails [Petechial Hemorrhages (___cm)] : no petechial hemorrhages [Skin Color & Pigmentation] : normal skin color and pigmentation [] : no rash [No Venous Stasis] : no venous stasis [No Skin Ulcers] : no skin ulcer [Skin Lesions] : no skin lesions [No Xanthoma] : no  xanthoma was observed [Oriented To Time, Place, And Person] : oriented to person, place, and time [Affect] : the affect was normal [Mood] : the mood was normal [No Anxiety] : not feeling anxious

## 2019-05-14 NOTE — DISCUSSION/SUMMARY
[FreeTextEntry1] : Mrs. Oreilly has no complaints and looks unchanged. Her exam is remarkable for no weight change, normal repeat blood pressure, clear lungs, and a normal cardiac exam. Her EKG done at her internist's office recently is within normal limits. I made no changes in her regimen. She will followup here on a p.r.n. basis.

## 2019-05-17 RX ORDER — PANTOPRAZOLE 40 MG/1
40 TABLET, DELAYED RELEASE ORAL
Qty: 30 | Refills: 1 | Status: DISCONTINUED | COMMUNITY
Start: 2017-04-17 | End: 2019-05-17

## 2019-05-24 ENCOUNTER — APPOINTMENT (OUTPATIENT)
Dept: INTERNAL MEDICINE | Facility: CLINIC | Age: 79
End: 2019-05-24
Payer: MEDICARE

## 2019-05-24 VITALS
WEIGHT: 150 LBS | BODY MASS INDEX: 26.58 KG/M2 | HEIGHT: 63 IN | TEMPERATURE: 97.7 F | SYSTOLIC BLOOD PRESSURE: 120 MMHG | DIASTOLIC BLOOD PRESSURE: 70 MMHG

## 2019-05-24 DIAGNOSIS — Z01.818 ENCOUNTER FOR OTHER PREPROCEDURAL EXAMINATION: ICD-10-CM

## 2019-05-24 PROCEDURE — 99213 OFFICE O/P EST LOW 20 MIN: CPT

## 2019-05-24 RX ORDER — ESCITALOPRAM OXALATE 5 MG/1
TABLET, FILM COATED ORAL
Refills: 0 | Status: DISCONTINUED | COMMUNITY
End: 2019-05-24

## 2019-06-03 ENCOUNTER — APPOINTMENT (OUTPATIENT)
Dept: INTERNAL MEDICINE | Facility: CLINIC | Age: 79
End: 2019-06-03
Payer: MEDICARE

## 2019-06-03 PROCEDURE — 99214 OFFICE O/P EST MOD 30 MIN: CPT

## 2019-06-04 VITALS — DIASTOLIC BLOOD PRESSURE: 70 MMHG | TEMPERATURE: 97.6 F | SYSTOLIC BLOOD PRESSURE: 120 MMHG

## 2019-06-10 ENCOUNTER — APPOINTMENT (OUTPATIENT)
Dept: INTERNAL MEDICINE | Facility: CLINIC | Age: 79
End: 2019-06-10
Payer: MEDICARE

## 2019-06-10 VITALS — SYSTOLIC BLOOD PRESSURE: 140 MMHG | DIASTOLIC BLOOD PRESSURE: 70 MMHG | TEMPERATURE: 97.4 F

## 2019-06-10 PROCEDURE — 99213 OFFICE O/P EST LOW 20 MIN: CPT

## 2019-06-14 ENCOUNTER — APPOINTMENT (OUTPATIENT)
Dept: UROLOGY | Facility: CLINIC | Age: 79
End: 2019-06-14
Payer: MEDICARE

## 2019-06-14 ENCOUNTER — OUTPATIENT (OUTPATIENT)
Dept: OUTPATIENT SERVICES | Facility: HOSPITAL | Age: 79
LOS: 1 days | End: 2019-06-14
Payer: MEDICARE

## 2019-06-14 ENCOUNTER — TRANSCRIPTION ENCOUNTER (OUTPATIENT)
Age: 79
End: 2019-06-14

## 2019-06-14 DIAGNOSIS — N20.0 CALCULUS OF KIDNEY: ICD-10-CM

## 2019-06-14 DIAGNOSIS — R35.0 FREQUENCY OF MICTURITION: ICD-10-CM

## 2019-06-14 PROCEDURE — 76775 US EXAM ABDO BACK WALL LIM: CPT

## 2019-06-14 PROCEDURE — 76775 US EXAM ABDO BACK WALL LIM: CPT | Mod: 26

## 2019-06-14 PROCEDURE — 99214 OFFICE O/P EST MOD 30 MIN: CPT | Mod: 25

## 2019-06-14 RX ORDER — MIRABEGRON 25 MG/1
25 TABLET, FILM COATED, EXTENDED RELEASE ORAL
Qty: 30 | Refills: 11 | Status: DISCONTINUED | COMMUNITY
Start: 2019-05-17 | End: 2019-06-14

## 2019-06-14 RX ORDER — DOXYCYCLINE HYCLATE 100 MG/1
100 TABLET ORAL
Qty: 14 | Refills: 0 | Status: DISCONTINUED | COMMUNITY
Start: 2019-06-03 | End: 2019-06-14

## 2019-06-14 NOTE — PHYSICAL EXAM
[General Appearance - In No Acute Distress] : no acute distress [General Appearance - Well Developed] : well developed [General Appearance - Well Nourished] : well nourished [Abdomen Soft] : soft [Skin Color & Pigmentation] : normal skin color and pigmentation [Edema] : no peripheral edema [Exaggerated Use Of Accessory Muscles For Inspiration] : no accessory muscle use [Oriented To Time, Place, And Person] : oriented to person, place, and time [Normal Station and Gait] : the gait and station were normal for the patient's age [No Focal Deficits] : no focal deficits

## 2019-06-14 NOTE — REVIEW OF SYSTEMS
[Sexually Transmitted Disease (STD)] : sexually transmitted disease [Recent Weight Loss (___ Lbs)] : recent [unfilled] ~Ulb weight loss [Date of last menstrual period ____] : date of last menstrual period: [unfilled] [Seen by urologist before (Name)  ___] : Previously seen by a urologist: [unfilled] [Urine retention] : urine retention [Wake up at night to urinate  How many times?  ___] : wakes up to urinate [unfilled] times during the night [Strong urge to urinate] : strong urge to urinate [Bladder pressure] : experiences bladder pressure [Bladder fullness after urinating] : bladder fullness after urinating [Strain or push to urinate] : strain or push to urinate [Negative] : Heme/Lymph [FreeTextEntry6] : frequent urination 10-12 times [FreeTextEntry1] : herpes

## 2019-06-14 NOTE — ASSESSMENT
[FreeTextEntry1] : Kidney stones. B and non-obstructing. \par --FLuid intake\par --Renal US in 1y\par \par Urinary sx. Improvement with oxybutynin. \par --Increase oxybutynin to 10mg\par --Limit caffeine \par

## 2019-06-14 NOTE — HISTORY OF PRESENT ILLNESS
[FreeTextEntry1] : 79yo female with cc of urinary frequency. SHe reports bothersome urinary frequency both day and night. SHe is going 20 times she states in the day. SHe will do some readjusting and get a little more out. This occurs more at night. Nocturia x3-4 (can't go more than 2h). SHe endorses urinary urgency. Occasional urinary urgency. Wears a pad for safety. SHe does not leak daily. No straining. Occasional feelings of incomplete emptying. No issues with UTIs. Was started on oxybutynin. Reports that nocturia decreased to 2x. Daytime frequency better too. She had bother from dry mouth but is now using biotin spray. \par \par Drinks a lot of water and ensure. Decaf coffee in the am and 3x per week has 2 cups of coffee playing bridge. No sensation of prolapse. Occasional issues with constipation. \par \par Pt had been seen by  in the past and reports she was told she had "a stone in ureter". SHe had lithotripsy and had her sx resolve. Suspect this was ureteral stone. SHe did not have pain. No other hx of stones. Underwent US today that shows B non obstructing stones. [facility] [facility] 8-10mm.

## 2019-06-18 PROBLEM — R35.0 INCREASED URINARY FREQUENCY: Status: ACTIVE | Noted: 2019-05-10

## 2019-06-24 DIAGNOSIS — N20.0 CALCULUS OF KIDNEY: ICD-10-CM

## 2019-07-18 ENCOUNTER — RESULT REVIEW (OUTPATIENT)
Age: 79
End: 2019-07-18

## 2019-07-18 ENCOUNTER — APPOINTMENT (OUTPATIENT)
Dept: GASTROENTEROLOGY | Facility: CLINIC | Age: 79
End: 2019-07-18
Payer: MEDICARE

## 2019-07-18 VITALS
WEIGHT: 153 LBS | BODY MASS INDEX: 27.11 KG/M2 | TEMPERATURE: 98.2 F | DIASTOLIC BLOOD PRESSURE: 80 MMHG | HEART RATE: 74 BPM | HEIGHT: 63 IN | OXYGEN SATURATION: 98 % | SYSTOLIC BLOOD PRESSURE: 130 MMHG

## 2019-07-18 PROCEDURE — 99203 OFFICE O/P NEW LOW 30 MIN: CPT

## 2019-07-24 NOTE — PHYSICAL EXAM
[General Appearance - Alert] : alert [General Appearance - In No Acute Distress] : in no acute distress [General Appearance - Well Nourished] : well nourished [General Appearance - Well Developed] : well developed [Sclera] : the sclera and conjunctiva were normal [Neck Appearance] : the appearance of the neck was normal [Neck Cervical Mass (___cm)] : no neck mass was observed [Jugular Venous Distention Increased] : there was no jugular-venous distention [Exaggerated Use Of Accessory Muscles For Inspiration] : no accessory muscle use [Heart Sounds] : normal S1 and S2 [Bowel Sounds] : normal bowel sounds [Abdomen Soft] : soft [Abdomen Tenderness] : non-tender [Cervical Lymph Nodes Enlarged Posterior Bilaterally] : posterior cervical [Cervical Lymph Nodes Enlarged Anterior Bilaterally] : anterior cervical [No CVA Tenderness] : no ~M costovertebral angle tenderness [No Spinal Tenderness] : no spinal tenderness [Nail Clubbing] : no clubbing  or cyanosis of the fingernails [] : no rash [No Focal Deficits] : no focal deficits [Oriented To Time, Place, And Person] : oriented to person, place, and time [Impaired Insight] : insight and judgment were intact [Affect] : the affect was normal

## 2019-07-24 NOTE — HISTORY OF PRESENT ILLNESS
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 The extent of Mendoza's is not well described in past endoscopy reports, but it seems that she has a short segment of Mendoza's and a possible hiatus hernia. 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 She has mild GERD symptoms managed on PPI. She denies dysphagia, chest pain or back pain. No hematemesis. No dyspepsia, bloating, epigastric pain. No change in bowel habits, blood in stool, bloating or other alterations in stool.\par No weight loss and appetite is stable. \par

## 2019-07-24 NOTE — ASSESSMENT
[FreeTextEntry1] : Plan for EGD, possible endomicroscopy and either ablation, resection with myself and Dr. Kennedy

## 2019-09-10 ENCOUNTER — OUTPATIENT (OUTPATIENT)
Dept: OUTPATIENT SERVICES | Facility: HOSPITAL | Age: 79
LOS: 1 days | End: 2019-09-10
Payer: MEDICARE

## 2019-09-10 VITALS
DIASTOLIC BLOOD PRESSURE: 80 MMHG | RESPIRATION RATE: 16 BRPM | SYSTOLIC BLOOD PRESSURE: 140 MMHG | HEART RATE: 67 BPM | TEMPERATURE: 98 F | HEIGHT: 63 IN | WEIGHT: 151.9 LBS | OXYGEN SATURATION: 98 %

## 2019-09-10 DIAGNOSIS — K22.70 BARRETT'S ESOPHAGUS WITHOUT DYSPLASIA: ICD-10-CM

## 2019-09-10 DIAGNOSIS — K22.711 BARRETT'S ESOPHAGUS WITH HIGH GRADE DYSPLASIA: ICD-10-CM

## 2019-09-10 DIAGNOSIS — Z98.890 OTHER SPECIFIED POSTPROCEDURAL STATES: Chronic | ICD-10-CM

## 2019-09-10 LAB
ALBUMIN SERPL ELPH-MCNC: 4.2 G/DL — SIGNIFICANT CHANGE UP (ref 3.3–5)
ALP SERPL-CCNC: 75 U/L — SIGNIFICANT CHANGE UP (ref 40–120)
ALT FLD-CCNC: 18 U/L — SIGNIFICANT CHANGE UP (ref 10–45)
ANION GAP SERPL CALC-SCNC: 12 MMOL/L — SIGNIFICANT CHANGE UP (ref 5–17)
AST SERPL-CCNC: 19 U/L — SIGNIFICANT CHANGE UP (ref 10–40)
BILIRUB SERPL-MCNC: 0.7 MG/DL — SIGNIFICANT CHANGE UP (ref 0.2–1.2)
BUN SERPL-MCNC: 19 MG/DL — SIGNIFICANT CHANGE UP (ref 7–23)
CALCIUM SERPL-MCNC: 9.6 MG/DL — SIGNIFICANT CHANGE UP (ref 8.4–10.5)
CHLORIDE SERPL-SCNC: 100 MMOL/L — SIGNIFICANT CHANGE UP (ref 96–108)
CO2 SERPL-SCNC: 27 MMOL/L — SIGNIFICANT CHANGE UP (ref 22–31)
CREAT SERPL-MCNC: 0.56 MG/DL — SIGNIFICANT CHANGE UP (ref 0.5–1.3)
GLUCOSE SERPL-MCNC: 87 MG/DL — SIGNIFICANT CHANGE UP (ref 70–99)
HCT VFR BLD CALC: 41.7 % — SIGNIFICANT CHANGE UP (ref 34.5–45)
HGB BLD-MCNC: 14.6 G/DL — SIGNIFICANT CHANGE UP (ref 11.5–15.5)
MCHC RBC-ENTMCNC: 33.3 PG — SIGNIFICANT CHANGE UP (ref 27–34)
MCHC RBC-ENTMCNC: 35 GM/DL — SIGNIFICANT CHANGE UP (ref 32–36)
MCV RBC AUTO: 95.2 FL — SIGNIFICANT CHANGE UP (ref 80–100)
PLATELET # BLD AUTO: 197 K/UL — SIGNIFICANT CHANGE UP (ref 150–400)
POTASSIUM SERPL-MCNC: 4.2 MMOL/L — SIGNIFICANT CHANGE UP (ref 3.5–5.3)
POTASSIUM SERPL-SCNC: 4.2 MMOL/L — SIGNIFICANT CHANGE UP (ref 3.5–5.3)
PROT SERPL-MCNC: 6.8 G/DL — SIGNIFICANT CHANGE UP (ref 6–8.3)
RBC # BLD: 4.38 M/UL — SIGNIFICANT CHANGE UP (ref 3.8–5.2)
RBC # FLD: 12.6 % — SIGNIFICANT CHANGE UP (ref 10.3–14.5)
SODIUM SERPL-SCNC: 139 MMOL/L — SIGNIFICANT CHANGE UP (ref 135–145)
WBC # BLD: 6.67 K/UL — SIGNIFICANT CHANGE UP (ref 3.8–10.5)
WBC # FLD AUTO: 6.67 K/UL — SIGNIFICANT CHANGE UP (ref 3.8–10.5)

## 2019-09-10 PROCEDURE — G0463: CPT

## 2019-09-10 PROCEDURE — 80053 COMPREHEN METABOLIC PANEL: CPT

## 2019-09-10 PROCEDURE — 85027 COMPLETE CBC AUTOMATED: CPT

## 2019-09-10 NOTE — H&P PST ADULT - DOES THIS PATIENT HAVE A HISTORY OF OR HAS BEEN DX WITH HEART FAILURE?
lisinopril-hydrochlorothiazide (PRINZIDE,ZESTORETIC) 10-12 5 MG per tablet      [facility]      [phone] ANY QUESTIONS no

## 2019-09-10 NOTE — H&P PST ADULT - NSICDXPASTMEDICALHX_GEN_ALL_CORE_FT
PAST MEDICAL HISTORY:  Anxiety     Depression     Murmur, cardiac PAST MEDICAL HISTORY:  Anxiety and depression medically managed. Never Hospitalized.    History of Mendoza's esophagus dx: ' 2012    Murmur, cardiac mild    Osteoarthritis PAST MEDICAL HISTORY:  Anxiety and depression medically managed. Never Hospitalized.    GERD (gastroesophageal reflux disease)     History of Mendoza's esophagus dx: ' 2012    Murmur, cardiac     Osteoarthritis

## 2019-09-10 NOTE — H&P PST ADULT - NSICDXPASTSURGICALHX_GEN_ALL_CORE_FT
PAST SURGICAL HISTORY:  Fracture repair of right knee with pinning    H/O abdominoplasty     H/O bilateral breast reduction surgery     H/O shoulder replacement right 6/2012    S/P appendectomy     S/P cataract extraction     S/P tonsillectomy PAST SURGICAL HISTORY:  Fracture ' 80's    repair of right knee with pinning    H/O abdominoplasty ' 80's    H/O bilateral breast reduction surgery ' 80's    H/O shoulder replacement right 6/2012        Left ' 2017    S/P appendectomy age 8    S/P cataract extraction '10   Bilateral    S/P tonsillectomy age 1 PAST SURGICAL HISTORY:  Fracture ' 80's    repair of right knee with pinning    H/O abdominoplasty ' 80's    H/O bilateral breast reduction surgery ' 80's    H/O shoulder replacement right 6/2012        Left ' 2017    S/P appendectomy age 8    S/P cataract extraction '10   Bilateral    S/P cosmetic plastic surgery Face    S/P tonsillectomy age 1

## 2019-09-10 NOTE — H&P PST ADULT - HISTORY OF PRESENT ILLNESS
This is an 79 year old female with PMH: Osteoarthtis: s./p ( '12 and '17): Bilateral Shoulder Replacements/ s/p (6/2018): Right TKR. Anxiety/ Depression: medically managed: never hospitalized, dx: ('12): Barretts Esophagus: recent biopsies  dx: High Grade Dysplasia. Scheduled: EGD/ VLE with Anesthesia.

## 2019-09-18 ENCOUNTER — OUTPATIENT (OUTPATIENT)
Dept: OUTPATIENT SERVICES | Facility: HOSPITAL | Age: 79
LOS: 1 days | End: 2019-09-18
Payer: MEDICARE

## 2019-09-18 ENCOUNTER — APPOINTMENT (OUTPATIENT)
Dept: GASTROENTEROLOGY | Facility: HOSPITAL | Age: 79
End: 2019-09-18

## 2019-09-18 ENCOUNTER — RESULT REVIEW (OUTPATIENT)
Age: 79
End: 2019-09-18

## 2019-09-18 DIAGNOSIS — K22.711 BARRETT'S ESOPHAGUS WITH HIGH GRADE DYSPLASIA: ICD-10-CM

## 2019-09-18 DIAGNOSIS — Z98.890 OTHER SPECIFIED POSTPROCEDURAL STATES: Chronic | ICD-10-CM

## 2019-09-18 PROCEDURE — 88305 TISSUE EXAM BY PATHOLOGIST: CPT | Mod: 26

## 2019-09-18 PROCEDURE — 43239 EGD BIOPSY SINGLE/MULTIPLE: CPT

## 2019-09-18 PROCEDURE — 88305 TISSUE EXAM BY PATHOLOGIST: CPT

## 2019-09-18 PROCEDURE — 45390 COLONOSCOPY W/RESECTION: CPT

## 2019-09-18 PROCEDURE — 43239 EGD BIOPSY SINGLE/MULTIPLE: CPT | Mod: GC,59

## 2019-09-18 PROCEDURE — 43252 EGD OPTICAL ENDOMICROSCOPY: CPT | Mod: GC,59

## 2019-09-18 PROCEDURE — 43254 EGD ENDO MUCOSAL RESECTION: CPT | Mod: GC

## 2019-09-18 RX ORDER — DIPHENHYDRAMINE HYDROCHLORIDE AND LIDOCAINE HYDROCHLORIDE AND ALUMINUM HYDROXIDE AND MAGNESIUM HYDRO
KIT EVERY 8 HOURS
Qty: 1 | Refills: 3 | Status: ACTIVE | COMMUNITY
Start: 2019-09-18 | End: 1900-01-01

## 2019-09-18 RX ORDER — SUCRALFATE 1 G/10ML
1 SUSPENSION ORAL 3 TIMES DAILY
Qty: 2 | Refills: 5 | Status: DISCONTINUED | COMMUNITY
Start: 2019-09-18 | End: 2019-09-18

## 2019-09-25 ENCOUNTER — CHART COPY (OUTPATIENT)
Age: 79
End: 2019-09-25

## 2019-09-26 PROBLEM — M19.90 UNSPECIFIED OSTEOARTHRITIS, UNSPECIFIED SITE: Chronic | Status: ACTIVE | Noted: 2019-09-10

## 2019-09-26 PROBLEM — Z87.19 PERSONAL HISTORY OF OTHER DISEASES OF THE DIGESTIVE SYSTEM: Chronic | Status: ACTIVE | Noted: 2019-09-10

## 2019-09-26 PROBLEM — K21.9 GASTRO-ESOPHAGEAL REFLUX DISEASE WITHOUT ESOPHAGITIS: Chronic | Status: ACTIVE | Noted: 2019-09-10

## 2019-09-26 PROBLEM — F41.9 ANXIETY DISORDER, UNSPECIFIED: Chronic | Status: ACTIVE | Noted: 2019-09-10

## 2019-10-07 DIAGNOSIS — M79.10 MYALGIA, UNSPECIFIED SITE: ICD-10-CM

## 2019-10-21 ENCOUNTER — APPOINTMENT (OUTPATIENT)
Dept: INTERNAL MEDICINE | Facility: CLINIC | Age: 79
End: 2019-10-21
Payer: MEDICARE

## 2019-10-21 VITALS
HEART RATE: 70 BPM | HEIGHT: 63 IN | WEIGHT: 153 LBS | DIASTOLIC BLOOD PRESSURE: 100 MMHG | SYSTOLIC BLOOD PRESSURE: 170 MMHG | BODY MASS INDEX: 27.11 KG/M2 | TEMPERATURE: 98.4 F | RESPIRATION RATE: 16 BRPM

## 2019-10-21 DIAGNOSIS — R42 DIZZINESS AND GIDDINESS: ICD-10-CM

## 2019-10-21 PROCEDURE — 36415 COLL VENOUS BLD VENIPUNCTURE: CPT

## 2019-10-21 PROCEDURE — 99214 OFFICE O/P EST MOD 30 MIN: CPT | Mod: 25

## 2019-10-21 RX ORDER — BUSPIRONE HYDROCHLORIDE 7.5 MG/1
TABLET ORAL
Refills: 0 | Status: DISCONTINUED | COMMUNITY
End: 2019-10-21

## 2019-10-21 RX ORDER — OXYBUTYNIN CHLORIDE 10 MG/1
10 TABLET, EXTENDED RELEASE ORAL
Qty: 30 | Refills: 11 | Status: DISCONTINUED | COMMUNITY
Start: 2019-05-10 | End: 2019-10-21

## 2019-10-21 RX ORDER — AZITHROMYCIN 250 MG/1
250 TABLET, FILM COATED ORAL
Qty: 1 | Refills: 0 | Status: DISCONTINUED | COMMUNITY
Start: 2019-06-03 | End: 2019-10-21

## 2019-10-21 NOTE — ASSESSMENT
[FreeTextEntry1] : Pt lightheaded and with significant hypertension.\par Labs sent\par To cut out salt\par To start Amlodipine 5 mg per day\par FU 1 week

## 2019-10-21 NOTE — HISTORY OF PRESENT ILLNESS
[FreeTextEntry1] : FU for feeling occasionally lightheaded. Pain in left thigh - -seeing PT\par Trying to lose weight.\par No chest pains\par Breathing OK\par No BRBPR, No melena.

## 2019-10-21 NOTE — REVIEW OF SYSTEMS
[Negative] : Gastrointestinal [Chest Pain] : no chest pain [Palpitations] : no palpitations [Shortness Of Breath] : no shortness of breath

## 2019-10-22 LAB
ALBUMIN SERPL ELPH-MCNC: 4.4 G/DL
ALP BLD-CCNC: 83 U/L
ALT SERPL-CCNC: 16 U/L
ANION GAP SERPL CALC-SCNC: 14 MMOL/L
APPEARANCE: CLEAR
AST SERPL-CCNC: 17 U/L
BASOPHILS # BLD AUTO: 0.06 K/UL
BASOPHILS NFR BLD AUTO: 0.8 %
BILIRUB SERPL-MCNC: 0.5 MG/DL
BILIRUBIN URINE: NEGATIVE
BLOOD URINE: NEGATIVE
BUN SERPL-MCNC: 29 MG/DL
CALCIUM SERPL-MCNC: 9.9 MG/DL
CHLORIDE SERPL-SCNC: 100 MMOL/L
CO2 SERPL-SCNC: 25 MMOL/L
COLOR: COLORLESS
CREAT SERPL-MCNC: 0.57 MG/DL
EOSINOPHIL # BLD AUTO: 0.16 K/UL
EOSINOPHIL NFR BLD AUTO: 2.1 %
ESTIMATED AVERAGE GLUCOSE: 94 MG/DL
GLUCOSE QUALITATIVE U: NEGATIVE
GLUCOSE SERPL-MCNC: 88 MG/DL
HBA1C MFR BLD HPLC: 4.9 %
HCT VFR BLD CALC: 44.7 %
HGB BLD-MCNC: 14.8 G/DL
IMM GRANULOCYTES NFR BLD AUTO: 0.4 %
KETONES URINE: NEGATIVE
LEUKOCYTE ESTERASE URINE: NEGATIVE
LYMPHOCYTES # BLD AUTO: 2.29 K/UL
LYMPHOCYTES NFR BLD AUTO: 30.6 %
MAN DIFF?: NORMAL
MCHC RBC-ENTMCNC: 32.5 PG
MCHC RBC-ENTMCNC: 33.1 GM/DL
MCV RBC AUTO: 98 FL
MONOCYTES # BLD AUTO: 0.51 K/UL
MONOCYTES NFR BLD AUTO: 6.8 %
NEUTROPHILS # BLD AUTO: 4.44 K/UL
NEUTROPHILS NFR BLD AUTO: 59.3 %
NITRITE URINE: NEGATIVE
PH URINE: 7
PLATELET # BLD AUTO: 229 K/UL
POTASSIUM SERPL-SCNC: 4.6 MMOL/L
PROT SERPL-MCNC: 6.9 G/DL
PROTEIN URINE: NEGATIVE
RBC # BLD: 4.56 M/UL
RBC # FLD: 12.9 %
SODIUM SERPL-SCNC: 139 MMOL/L
SPECIFIC GRAVITY URINE: 1.01
T4 SERPL-MCNC: 5.8 UG/DL
TSH SERPL-ACNC: 6.53 UIU/ML
UROBILINOGEN URINE: NORMAL
WBC # FLD AUTO: 7.49 K/UL

## 2019-10-29 ENCOUNTER — APPOINTMENT (OUTPATIENT)
Dept: INTERNAL MEDICINE | Facility: CLINIC | Age: 79
End: 2019-10-29
Payer: MEDICARE

## 2019-10-29 VITALS
RESPIRATION RATE: 16 BRPM | SYSTOLIC BLOOD PRESSURE: 140 MMHG | HEART RATE: 76 BPM | BODY MASS INDEX: 26.93 KG/M2 | HEIGHT: 63 IN | DIASTOLIC BLOOD PRESSURE: 85 MMHG | WEIGHT: 152 LBS

## 2019-10-29 PROCEDURE — 99213 OFFICE O/P EST LOW 20 MIN: CPT

## 2019-10-29 NOTE — PHYSICAL EXAM
[Normal Sclera/Conjunctiva] : normal sclera/conjunctiva [Normal Oropharynx] : the oropharynx was normal [No JVD] : no jugular venous distention [No Lymphadenopathy] : no lymphadenopathy [Supple] : supple [Normal] : normal rate, regular rhythm, normal S1 and S2 and no murmur heard [No Edema] : there was no peripheral edema [Soft] : abdomen soft [Non Tender] : non-tender [No HSM] : no HSM

## 2019-10-29 NOTE — HISTORY OF PRESENT ILLNESS
[FreeTextEntry1] : FU for hypertension - on Amlodipine\par Watching salt\par Doing some water aerobics.\par

## 2019-11-05 ENCOUNTER — APPOINTMENT (OUTPATIENT)
Dept: CHRONIC DISEASE MANAGEMENT | Facility: CLINIC | Age: 79
End: 2019-11-05
Payer: MEDICARE

## 2019-11-05 PROCEDURE — 97802 MEDICAL NUTRITION INDIV IN: CPT

## 2019-11-12 ENCOUNTER — APPOINTMENT (OUTPATIENT)
Dept: INTERNAL MEDICINE | Facility: CLINIC | Age: 79
End: 2019-11-12
Payer: MEDICARE

## 2019-11-12 VITALS — SYSTOLIC BLOOD PRESSURE: 130 MMHG | DIASTOLIC BLOOD PRESSURE: 80 MMHG | RESPIRATION RATE: 16 BRPM | HEART RATE: 72 BPM

## 2019-11-12 PROCEDURE — 99213 OFFICE O/P EST LOW 20 MIN: CPT

## 2019-11-12 NOTE — PHYSICAL EXAM
[Normal Sclera/Conjunctiva] : normal sclera/conjunctiva [Normal Oropharynx] : the oropharynx was normal [No JVD] : no jugular venous distention [No Lymphadenopathy] : no lymphadenopathy [Supple] : supple [Normal] : normal rate, regular rhythm, normal S1 and S2 and no murmur heard [Non Tender] : non-tender [No Edema] : there was no peripheral edema [Soft] : abdomen soft [Non-distended] : non-distended [No HSM] : no HSM [Normal Supraclavicular Nodes] : no supraclavicular lymphadenopathy [Normal Posterior Cervical Nodes] : no posterior cervical lymphadenopathy [Normal Anterior Cervical Nodes] : no anterior cervical lymphadenopathy

## 2019-11-18 ENCOUNTER — OUTPATIENT (OUTPATIENT)
Dept: OUTPATIENT SERVICES | Facility: HOSPITAL | Age: 79
LOS: 1 days | End: 2019-11-18
Payer: MEDICARE

## 2019-11-18 ENCOUNTER — APPOINTMENT (OUTPATIENT)
Dept: GASTROENTEROLOGY | Facility: HOSPITAL | Age: 79
End: 2019-11-18

## 2019-11-18 DIAGNOSIS — Z98.890 OTHER SPECIFIED POSTPROCEDURAL STATES: Chronic | ICD-10-CM

## 2019-11-18 DIAGNOSIS — K22.711 BARRETT'S ESOPHAGUS WITH HIGH GRADE DYSPLASIA: ICD-10-CM

## 2019-11-18 DIAGNOSIS — Z01.818 ENCOUNTER FOR OTHER PREPROCEDURAL EXAMINATION: ICD-10-CM

## 2019-11-18 PROCEDURE — 43270 EGD LESION ABLATION: CPT

## 2019-11-18 PROCEDURE — 43236 UPPR GI SCOPE W/SUBMUC INJ: CPT | Mod: GC,59

## 2019-11-18 PROCEDURE — 43270 EGD LESION ABLATION: CPT | Mod: GC

## 2020-01-20 NOTE — H&P PST ADULT - EXTREMITIES
Assessment/Plan:      Diagnoses and all orders for this visit:    Constipation, unspecified constipation type  -     Ambulatory referral to Pediatric Gastroenterology; Future          Subjective:     Patient ID: Bonifacio Chirinos is a 1 y o  male  He has problem with constipation for one year   Review of Systems   Constitutional: Negative  HENT: Negative  Eyes: Negative  Respiratory: Negative  Cardiovascular: Negative  Gastrointestinal: Positive for constipation  Endocrine: Negative  Genitourinary: Negative  Musculoskeletal: Negative  Negative for arthralgias  Skin: Negative  Allergic/Immunologic: Negative  Neurological: Negative  Hematological: Negative  Psychiatric/Behavioral: Negative  Objective:     Physical Exam   Constitutional: He appears well-developed and well-nourished  He is active  HENT:   Right Ear: Tympanic membrane normal    Left Ear: Tympanic membrane normal    Nose: Nose normal    Mouth/Throat: Mucous membranes are moist  Dentition is normal  Oropharynx is clear  Eyes: Pupils are equal, round, and reactive to light  Conjunctivae and EOM are normal    Neck: Normal range of motion  Neck supple  Cardiovascular: Normal rate, regular rhythm, S1 normal and S2 normal    Pulmonary/Chest: Effort normal and breath sounds normal    Abdominal: Soft  He exhibits no distension, no mass and no abnormal umbilicus  No surgical scars  There is no hepatosplenomegaly, splenomegaly or hepatomegaly  There is no tenderness  There is no rigidity, no rebound and no guarding  No hernia  Genitourinary: Penis normal    Musculoskeletal: Normal range of motion  Neurological: He is alert  Skin: Skin is warm  Capillary refill takes less than 2 seconds  Nursing note and vitals reviewed 
No cyanosis, clubbing or edema

## 2020-03-02 ENCOUNTER — APPOINTMENT (OUTPATIENT)
Dept: GASTROENTEROLOGY | Facility: HOSPITAL | Age: 80
End: 2020-03-02

## 2020-03-02 ENCOUNTER — OUTPATIENT (OUTPATIENT)
Dept: OUTPATIENT SERVICES | Facility: HOSPITAL | Age: 80
LOS: 1 days | Discharge: HOME | End: 2020-03-02
Payer: MEDICARE

## 2020-03-02 DIAGNOSIS — Z98.890 OTHER SPECIFIED POSTPROCEDURAL STATES: Chronic | ICD-10-CM

## 2020-03-02 DIAGNOSIS — K22.711 BARRETT'S ESOPHAGUS WITH HIGH GRADE DYSPLASIA: ICD-10-CM

## 2020-03-02 PROCEDURE — 43236 UPPR GI SCOPE W/SUBMUC INJ: CPT | Mod: GC,59

## 2020-03-02 PROCEDURE — 43270 EGD LESION ABLATION: CPT

## 2020-03-02 PROCEDURE — 43270 EGD LESION ABLATION: CPT | Mod: GC

## 2020-08-10 ENCOUNTER — APPOINTMENT (OUTPATIENT)
Dept: INTERNAL MEDICINE | Facility: CLINIC | Age: 80
End: 2020-08-10
Payer: MEDICARE

## 2020-08-10 ENCOUNTER — NON-APPOINTMENT (OUTPATIENT)
Age: 80
End: 2020-08-10

## 2020-08-10 ENCOUNTER — LABORATORY RESULT (OUTPATIENT)
Age: 80
End: 2020-08-10

## 2020-08-10 VITALS — OXYGEN SATURATION: 98 % | BODY MASS INDEX: 26.63 KG/M2 | HEIGHT: 64 IN | WEIGHT: 156 LBS | HEART RATE: 75 BPM

## 2020-08-10 VITALS
RESPIRATION RATE: 16 BRPM | HEIGHT: 64 IN | DIASTOLIC BLOOD PRESSURE: 80 MMHG | WEIGHT: 157 LBS | BODY MASS INDEX: 26.8 KG/M2 | HEART RATE: 72 BPM | SYSTOLIC BLOOD PRESSURE: 125 MMHG

## 2020-08-10 VITALS — TEMPERATURE: 98 F | BODY MASS INDEX: 26.8 KG/M2 | HEIGHT: 64 IN | WEIGHT: 157 LBS

## 2020-08-10 VITALS — TEMPERATURE: 98.7 F

## 2020-08-10 DIAGNOSIS — F41.0 PANIC DISORDER [EPISODIC PAROXYSMAL ANXIETY]: ICD-10-CM

## 2020-08-10 DIAGNOSIS — J06.9 ACUTE UPPER RESPIRATORY INFECTION, UNSPECIFIED: ICD-10-CM

## 2020-08-10 PROCEDURE — 36415 COLL VENOUS BLD VENIPUNCTURE: CPT

## 2020-08-10 PROCEDURE — G0444 DEPRESSION SCREEN ANNUAL: CPT | Mod: 59

## 2020-08-10 PROCEDURE — 99497 ADVNCD CARE PLAN 30 MIN: CPT | Mod: 33

## 2020-08-10 PROCEDURE — 93000 ELECTROCARDIOGRAM COMPLETE: CPT | Mod: 59

## 2020-08-10 PROCEDURE — G0439: CPT

## 2020-08-10 PROCEDURE — 99214 OFFICE O/P EST MOD 30 MIN: CPT | Mod: 25

## 2020-08-10 NOTE — REVIEW OF SYSTEMS
[Hearing Loss] : hearing loss [Negative] : Neurological [de-identified] : on meds [FreeTextEntry3] : last optho - 9/20

## 2020-08-10 NOTE — HISTORY OF PRESENT ILLNESS
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

## 2020-08-10 NOTE — HEALTH RISK ASSESSMENT
[Good] : ~his/her~  mood as  good [1 or 2 (0 pts)] : 1 or 2 (0 points) [4 or more  times a week (4 pts)] : 4 or more  times a week (4 points) [Yes] : Yes [Never (0 pts)] : Never (0 points) [No falls in past year] : Patient reported no falls in the past year [None] : None [Retired] : retired [With Family] : lives with family [Feels Safe at Home] : Feels safe at home [Fully functional (bathing, dressing, toileting, transferring, walking, feeding)] : Fully functional (bathing, dressing, toileting, transferring, walking, feeding) [Fully functional (using the telephone, shopping, preparing meals, housekeeping, doing laundry, using] : Fully functional and needs no help or supervision to perform IADLs (using the telephone, shopping, preparing meals, housekeeping, doing laundry, using transportation, managing medications and managing finances) [Smoke Detector] : smoke detector [Carbon Monoxide Detector] : carbon monoxide detector [Seat Belt] :  uses seat belt [Sunscreen] : uses sunscreen [With Patient/Caregiver] : With Patient/Caregiver [Designated Healthcare Proxy] : Designated healthcare proxy [Name: ___] : Health Care Proxy's Name: [unfilled]  [] : No [de-identified] : Swimming, Yoga [de-identified] : Not great [Change in mental status noted] : No change in mental status noted [FreeTextEntry4] : Had long discussion with the patient.\par Discussed with pt the need for a health care proxy.\par Discussed who can be a proxy, forms given if needed, and the need for the proxy to know their wishes and to make sure they will comply.\par The proxy will need to have a copy in their home and the patient should have a copy.\par \par  [AdvancecareDate] : 08/20

## 2020-08-10 NOTE — PHYSICAL EXAM
[No Acute Distress] : no acute distress [Well Nourished] : well nourished [Well Developed] : well developed [Well-Appearing] : well-appearing [Normal Sclera/Conjunctiva] : normal sclera/conjunctiva [PERRL] : pupils equal round and reactive to light [EOMI] : extraocular movements intact [Normal Outer Ear/Nose] : the outer ears and nose were normal in appearance [Normal Oropharynx] : the oropharynx was normal [Normal TMs] : both tympanic membranes were normal [No JVD] : no jugular venous distention [No Lymphadenopathy] : no lymphadenopathy [Thyroid Normal, No Nodules] : the thyroid was normal and there were no nodules present [No Respiratory Distress] : no respiratory distress  [Supple] : supple [No Accessory Muscle Use] : no accessory muscle use [Clear to Auscultation] : lungs were clear to auscultation bilaterally [Normal Rate] : normal rate  [Normal S1, S2] : normal S1 and S2 [Regular Rhythm] : with a regular rhythm [No Carotid Bruits] : no carotid bruits [No Murmur] : no murmur heard [No Varicosities] : no varicosities [No Abdominal Bruit] : a ~M bruit was not heard ~T in the abdomen [No Edema] : there was no peripheral edema [Pedal Pulses Present] : the pedal pulses are present [No Palpable Aorta] : no palpable aorta [No Extremity Clubbing/Cyanosis] : no extremity clubbing/cyanosis [No Axillary Lymphadenopathy] : no axillary lymphadenopathy [Non Tender] : non-tender [Soft] : abdomen soft [Non-distended] : non-distended [No HSM] : no HSM [Normal Bowel Sounds] : normal bowel sounds [No Masses] : no abdominal mass palpated [Normal Supraclavicular Nodes] : no supraclavicular lymphadenopathy [No Hernias] : no hernias [Normal Axillary Nodes] : no axillary lymphadenopathy [Normal Posterior Cervical Nodes] : no posterior cervical lymphadenopathy [No Spinal Tenderness] : no spinal tenderness [No CVA Tenderness] : no CVA  tenderness [Normal Anterior Cervical Nodes] : no anterior cervical lymphadenopathy [No Joint Swelling] : no joint swelling [Grossly Normal Strength/Tone] : grossly normal strength/tone [No Focal Deficits] : no focal deficits [Coordination Grossly Intact] : coordination grossly intact [No Rash] : no rash [Normal Affect] : the affect was normal [Normal Gait] : normal gait [Normal Insight/Judgement] : insight and judgment were intact [Normal] : affect was normal and insight and judgment were intact [de-identified] : SP bilateral reductions [FreeTextEntry1] : Deferred to GYN [de-identified] : ALEXD  -SP bilateral shoulder and right knee replacement  Some pain in right groin to hip [de-identified] : Deferred to GYN

## 2020-08-10 NOTE — ASSESSMENT
[FreeTextEntry1] : Pt with above medical issues which requires extra work in addition to the well visit.\par Bloods drawn in office.\par Meds to be adjusted.\par To fu with ortho\par To do fu chest CTT in 2/21\par Health counseling given.

## 2020-08-11 LAB
25(OH)D3 SERPL-MCNC: 43.5 NG/ML
ALBUMIN SERPL ELPH-MCNC: 4.7 G/DL
ALP BLD-CCNC: 98 U/L
ALT SERPL-CCNC: 16 U/L
ANION GAP SERPL CALC-SCNC: 11 MMOL/L
APPEARANCE: CLEAR
AST SERPL-CCNC: 20 U/L
BASOPHILS # BLD AUTO: 0.06 K/UL
BASOPHILS NFR BLD AUTO: 1.1 %
BILIRUB SERPL-MCNC: 0.6 MG/DL
BILIRUBIN URINE: NEGATIVE
BLOOD URINE: NEGATIVE
BUN SERPL-MCNC: 25 MG/DL
CALCIUM SERPL-MCNC: 9.8 MG/DL
CHLORIDE SERPL-SCNC: 98 MMOL/L
CHOLEST SERPL-MCNC: 263 MG/DL
CHOLEST/HDLC SERPL: 3.1 RATIO
CO2 SERPL-SCNC: 30 MMOL/L
COLOR: NORMAL
CREAT SERPL-MCNC: 0.6 MG/DL
EOSINOPHIL # BLD AUTO: 0.13 K/UL
EOSINOPHIL NFR BLD AUTO: 2.5 %
GLUCOSE QUALITATIVE U: NEGATIVE
GLUCOSE SERPL-MCNC: 88 MG/DL
HCT VFR BLD CALC: 44.5 %
HDLC SERPL-MCNC: 85 MG/DL
HGB BLD-MCNC: 14.9 G/DL
IMM GRANULOCYTES NFR BLD AUTO: 0.2 %
KETONES URINE: NEGATIVE
LDLC SERPL CALC-MCNC: 155 MG/DL
LEUKOCYTE ESTERASE URINE: NEGATIVE
LYMPHOCYTES # BLD AUTO: 1.94 K/UL
LYMPHOCYTES NFR BLD AUTO: 36.6 %
MAN DIFF?: NORMAL
MCHC RBC-ENTMCNC: 32.7 PG
MCHC RBC-ENTMCNC: 33.5 GM/DL
MCV RBC AUTO: 97.8 FL
MONOCYTES # BLD AUTO: 0.5 K/UL
MONOCYTES NFR BLD AUTO: 9.4 %
NEUTROPHILS # BLD AUTO: 2.66 K/UL
NEUTROPHILS NFR BLD AUTO: 50.2 %
NITRITE URINE: NEGATIVE
PH URINE: 7
PLATELET # BLD AUTO: 212 K/UL
POTASSIUM SERPL-SCNC: 4.1 MMOL/L
PROT SERPL-MCNC: 6.9 G/DL
PROTEIN URINE: NEGATIVE
RBC # BLD: 4.55 M/UL
RBC # FLD: 12.9 %
SODIUM SERPL-SCNC: 139 MMOL/L
SPECIFIC GRAVITY URINE: 1.01
TRIGL SERPL-MCNC: 117 MG/DL
TSH SERPL-ACNC: 5.38 UIU/ML
UROBILINOGEN URINE: NORMAL
WBC # FLD AUTO: 5.3 K/UL

## 2020-08-14 ENCOUNTER — APPOINTMENT (OUTPATIENT)
Dept: DISASTER EMERGENCY | Facility: CLINIC | Age: 80
End: 2020-08-14

## 2020-08-15 LAB — SARS-COV-2 N GENE NPH QL NAA+PROBE: NOT DETECTED

## 2020-08-17 ENCOUNTER — RESULT REVIEW (OUTPATIENT)
Age: 80
End: 2020-08-17

## 2020-08-17 ENCOUNTER — APPOINTMENT (OUTPATIENT)
Dept: GASTROENTEROLOGY | Facility: HOSPITAL | Age: 80
End: 2020-08-17

## 2020-08-17 ENCOUNTER — OUTPATIENT (OUTPATIENT)
Dept: OUTPATIENT SERVICES | Facility: HOSPITAL | Age: 80
LOS: 1 days | Discharge: HOME | End: 2020-08-17
Payer: MEDICARE

## 2020-08-17 VITALS — RESPIRATION RATE: 16 BRPM | HEART RATE: 70 BPM | DIASTOLIC BLOOD PRESSURE: 94 MMHG | SYSTOLIC BLOOD PRESSURE: 136 MMHG

## 2020-08-17 VITALS — TEMPERATURE: 98 F | HEIGHT: 64 IN | WEIGHT: 149.03 LBS

## 2020-08-17 DIAGNOSIS — K22.70 BARRETT'S ESOPHAGUS WITHOUT DYSPLASIA: ICD-10-CM

## 2020-08-17 DIAGNOSIS — Z98.890 OTHER SPECIFIED POSTPROCEDURAL STATES: Chronic | ICD-10-CM

## 2020-08-17 PROCEDURE — 88312 SPECIAL STAINS GROUP 1: CPT | Mod: 26

## 2020-08-17 PROCEDURE — 43239 EGD BIOPSY SINGLE/MULTIPLE: CPT

## 2020-08-17 PROCEDURE — 88305 TISSUE EXAM BY PATHOLOGIST: CPT | Mod: 26

## 2020-08-17 PROCEDURE — 88312 SPECIAL STAINS GROUP 1: CPT

## 2020-08-17 PROCEDURE — 88341 IMHCHEM/IMCYTCHM EA ADD ANTB: CPT

## 2020-08-17 PROCEDURE — 43239 EGD BIOPSY SINGLE/MULTIPLE: CPT | Mod: GC

## 2020-08-17 PROCEDURE — 88305 TISSUE EXAM BY PATHOLOGIST: CPT

## 2020-08-17 PROCEDURE — 88342 IMHCHEM/IMCYTCHM 1ST ANTB: CPT | Mod: 26,59

## 2020-08-17 RX ORDER — SODIUM CHLORIDE 9 MG/ML
1000 INJECTION INTRAMUSCULAR; INTRAVENOUS; SUBCUTANEOUS
Refills: 0 | Status: DISCONTINUED | OUTPATIENT
Start: 2020-08-17 | End: 2020-09-01

## 2020-08-17 NOTE — ASU PREOP CHECKLIST - ADVANCE DIRECTIVE ADDRESSED/READDRESSED
PT DAILY TREATMENT NOTE - [facility]     Patient Name: Victor Manuel Fowler  Date:2018  : 1954  [x]  Patient  Verified  Payor: BLUE CROSS / Plan: [address] [place] [address] / Product Type: PPO /    In time:8:54  Out time:9:27  Total Treatment Time (min): 33  Total Timed Codes (min): 33  1:1 Treatment Time ( only): 33   Visit #: 6 of 16     Treatment Area: Vertigo [R42]    SUBJECTIVE  Pain Level (0-10 scale): 2  Any medication changes, allergies to medications, adverse drug reactions, diagnosis change, or new procedure performed?: [x] No    [] Yes (see summary sheet for update)  Subjective functional status/changes:   [] No changes reported  Pt reports that she has notices that when she has to make quick turns at work, her balance does not feel as off as it was before.      OBJECTIVE        Min Type Additional Details    [] Estim:  []Unatt       []IFC  []Premod                        []Other:  []w/ice   []w/heat  Position:  Location:    [] Estim: []Att    []TENS instruct  []NMES                    []Other:  []w/US   []w/ice   []w/heat  Position:  Location:    []  Traction: [] Cervical       []Lumbar                       [] Prone          []Supine                       []Intermittent   []Continuous Lbs:  [] before manual  [] after manual    []  Ultrasound: []Continuous   [] Pulsed                           []1MHz   []3MHz W/cm2:  Location:    []  Iontophoresis with dexamethasone         Location: [] Take home patch   [] In clinic    []  Ice     []  heat  []  Ice massage  []  Laser   []  Anodyne Position:  Location:    []  Laser with stim  []  Other:  Position:  Location:    []  Vasopneumatic Device Pressure:       [] lo [] med [] hi   Temperature: [] lo [] med [] hi   [] Skin assessment post-treatment:  []intact []redness- no adverse reaction    []redness  adverse reaction:           33 min Neuromuscular Re-education:  [x]  See flow sheet :vertigo exercises and dynamic gait   Rationale: improve coordination, improve balance and increase proprioception  to improve the patients ability to increase ease with ADLs. With   [] TE   [] TA   [] neuro   [] other: Patient Education: [x] Review HEP    [] Progressed/Changed HEP based on:   [] positioning   [] body mechanics   [] transfers   [] heat/ice application    [] other:      Other Objective/Functional Measures: FOTO = 58, increased by 4 since SOC     Pain Level (0-10 scale) post treatment: 2    ASSESSMENT/Changes in Function: Progressed pt with VOR1 and corrective saccades to standing which was more challenging to pt. Pt continues to have LOB with head turns horizontally with ambulation. Pt reports when she transitions from forward to backward walking it causes her to feel off balance because she feels her body is still going but she physically has stopped. Patient will continue to benefit from skilled PT services to modify and progress therapeutic interventions, address functional mobility deficits and address imbalance/dizziness to attain remaining goals. []  See Plan of Care  []  See progress note/recertification  []  See Discharge Summary         Progress towards goals / Updated goals:  Short Term Goals: To be accomplished in 1 weeks:  1. Pt will be independent and complaint w/ HEP to progress gains in PT. At [facility]: initiated HEP  Current; Goal met: Pt reports performing HEP. 5/30/18  Long Term Goals: To be accomplished in 8 weeks:  1. Pt will improve FOTO to > or = to 77 to demo improved function. At [facility]: FOTO = 54  Current: Progressing: FOTO = 58, increased by 4 since [facility]. 6/12/18  2. Pt will increase balance as demo'd by MSR EC for 20 seconds w/o LOB or HHA for decreased fall risk. At [facility]: MSR B Mod sway w/ 1 LOB requiring min A for therapist to correct  3. Pt will improve gaze stability as demo'd by no increased symptoms w/ VOR x1 horizontally and vertically for improved tolerance to looking up.    At [facility]: VOR x1 (+) horizontally and vertically w/ increased symptoms after just 5 reps  Current: Not met: Pt continues to have increase in symptoms with VOR 1 10 reps vertical and horizontal. 6/6/18  4. Pt will report > or = to 75%improvement in symptoms for ease w/ return to unrestricted ADLs.    At eval: 0%           PLAN  []  Upgrade activities as tolerated     [x]  Continue plan of care  []  Update interventions per flow sheet       []  Discharge due to:_  []  Other:_      Neisha Davalos PTA 6/12/2018  9:23 AM    Future Appointments  Date Time Provider Kevin Woodward   6/15/2018 9:00 AM Neisha Davalos PTA [facility]   6/19/2018 8:00 AM Neisha Davalos, PTA [facility]   6/22/2018 7:30 AM Jaime Sheikh, PT [facility]   6/25/2018 7:30 AM Neisha Davalos, PTA [facility]   6/29/2018 8:30 AM Neisha Davalos, PTA [facility]   7/2/2018 8:00 AM Neisha Davalos, PTA [facility]   7/6/2018 8:00 AM Neisha Davalos, PTA [facility]   7/10/2018 8:00 AM Neisha Susannah, PTA [facility]   7/13/2018 8:00 AM Neishawilliam Davalos, PTA [facility]   7/17/2018 8:00 AM Neisha Susannah, PTA [facility]   7/20/2018 8:00 AM Neisha Susananh, PTA [facility] done

## 2020-08-17 NOTE — ASU PATIENT PROFILE, ADULT - PSH
Fracture  ' 80's    repair of right knee with pinning  H/O abdominoplasty  ' 80's  H/O bilateral breast reduction surgery  ' 80's  H/O shoulder replacement  right 6/2012        Left ' 2017  S/P appendectomy  age 8  S/P cataract extraction  '10   Bilateral  S/P cosmetic plastic surgery  Face  S/P tonsillectomy  age 1

## 2020-08-17 NOTE — PRE PROCEDURE NOTE - PRE PROCEDURE EVALUATION
Attending Physician:  Marcia                       Procedure: upper endoscopy    Indication for Procedure: Mendoza's esophagus  ________________________________________________________  PAST MEDICAL & SURGICAL HISTORY:  GERD (gastroesophageal reflux disease)  Anxiety and depression: medically managed. Never Hospitalized.  Osteoarthritis  History of Mendoza's esophagus: dx: &#x27; 2012  Murmur, cardiac  S/P cosmetic plastic surgery: Face  S/P tonsillectomy: age 1  S/P cataract extraction: &#x27;10   Bilateral  H/O bilateral breast reduction surgery: &#x27; 80&#x27;s  H/O abdominoplasty: &#x27; 80&#x27;s  H/O shoulder replacement: right 6/2012        Left &#x27; 2017  Fracture: &#x27; 80&#x27;s    repair of right knee with pinning  S/P appendectomy: age 8    ALLERGIES:  dust (Sneezing)  No Known Drug Allergies  penicillin (Diarrhea)    HOME MEDICATIONS:  Allegra  180 milligrams: 1 dose(s) orally once a day (at bedtime)  ALPRAZolam 0.5 mg oral tablet: 1 tab(s) orally once a day (at bedtime)  buPROPion 150 mg/24 hours (XL) oral tablet, extended release: 1 tab(s) orally once a day (at bedtime)  Lexapro 20 mg oral tablet: 1 tab(s) orally once a day (at bedtime)  pantoprazole 40 mg oral delayed release tablet: 1 tab(s) orally once a day (at bedtime)  Valcyclovir   500 milligrams: 1 dose(s) orally once a day (at bedtime)    AICD/PPM: [ ] yes   [x ] no    PERTINENT LAB DATA:                      PHYSICAL EXAMINATION:    T(C): --  HR: --  BP: --  RR: --  SpO2: --    Constitutional: NAD  HEENT: PERRLA, EOMI,    Neck:  No JVD  Respiratory: CTAB/L  Cardiovascular: S1 and S2  Gastrointestinal: BS+, soft, NT/ND  Extremities: No peripheral edema  Neurological: A/O x 3, no focal deficits  Psychiatric: Normal mood, normal affect  Skin: No rashes    ASA Class: I [ ]  II [ ]  III [x ]  IV [ ]    COMMENTS:    The patient is a suitable candidate for the planned procedure unless box checked [ ]  No, explain:

## 2020-08-17 NOTE — ASU PATIENT PROFILE, ADULT - PMH
Anxiety and depression  medically managed. Never Hospitalized.  GERD (gastroesophageal reflux disease)    History of Mendoza's esophagus  dx: ' 2012  Murmur, cardiac    Osteoarthritis

## 2020-08-17 NOTE — ASU DISCHARGE PLAN (ADULT/PEDIATRIC) - NSDISCH_BIOPSY_ENDO_ALL_CORE_FT
If you had a biopsy, you should not take aspirin or aspirin like products for the next 10 days unless instructed to do so by your doctor. If you had a biopsy, check with your doctor before taking any blood thinners such as warfarin (Coumadin).

## 2020-09-03 ENCOUNTER — APPOINTMENT (OUTPATIENT)
Dept: CARDIOLOGY | Facility: CLINIC | Age: 80
End: 2020-09-03
Payer: MEDICARE

## 2020-09-03 PROCEDURE — 93306 TTE W/DOPPLER COMPLETE: CPT

## 2020-10-06 ENCOUNTER — APPOINTMENT (OUTPATIENT)
Dept: INTERNAL MEDICINE | Facility: CLINIC | Age: 80
End: 2020-10-06

## 2020-10-08 ENCOUNTER — NON-APPOINTMENT (OUTPATIENT)
Age: 80
End: 2020-10-08

## 2020-10-08 ENCOUNTER — APPOINTMENT (OUTPATIENT)
Dept: CARDIOLOGY | Facility: CLINIC | Age: 80
End: 2020-10-08
Payer: MEDICARE

## 2020-10-08 ENCOUNTER — APPOINTMENT (OUTPATIENT)
Dept: INTERNAL MEDICINE | Facility: CLINIC | Age: 80
End: 2020-10-08
Payer: MEDICARE

## 2020-10-08 VITALS
BODY MASS INDEX: 26.95 KG/M2 | DIASTOLIC BLOOD PRESSURE: 84 MMHG | WEIGHT: 157 LBS | SYSTOLIC BLOOD PRESSURE: 120 MMHG | TEMPERATURE: 98.6 F | OXYGEN SATURATION: 94 % | HEART RATE: 95 BPM

## 2020-10-08 VITALS — HEIGHT: 64 IN | TEMPERATURE: 98 F | WEIGHT: 156 LBS | BODY MASS INDEX: 26.63 KG/M2

## 2020-10-08 VITALS — SYSTOLIC BLOOD PRESSURE: 120 MMHG | DIASTOLIC BLOOD PRESSURE: 78 MMHG

## 2020-10-08 DIAGNOSIS — R06.00 DYSPNEA, UNSPECIFIED: ICD-10-CM

## 2020-10-08 DIAGNOSIS — Z01.818 ENCOUNTER FOR OTHER PREPROCEDURAL EXAMINATION: ICD-10-CM

## 2020-10-08 PROCEDURE — 99214 OFFICE O/P EST MOD 30 MIN: CPT

## 2020-10-08 NOTE — HISTORY OF PRESENT ILLNESS
[FreeTextEntry1] : 80-year-old female with a history of hypertension, hypercholesterolemia, bifascicular block, atypical chest pain, dyspnea on exertion.  She is feeling generally well at present.  She continues to swim several times a week without difficulty and does not notice any change in her exercise capacity.  She will be having an endoscopic procedure for residual Mendoza's esophagus in a few weeks and requires clearance.

## 2020-10-08 NOTE — ASSESSMENT
[Patient Optimized for Surgery] : Patient optimized for surgery [No Further Testing Recommended] : no further testing recommended [Continue medications as is] : Continue current medications [As per surgery] : as per surgery [FreeTextEntry4] : 81 y/o F with history as noted, proposed for upper endoscopy for management of Mendoza's Esophagus. She is presently medically optimized for procedure, may proceed without any further testing. Also note she was cleared by her Cardiologist, Dr. Krause, this morning, as well.

## 2020-10-08 NOTE — DISCUSSION/SUMMARY
[FreeTextEntry1] : Ms Oreilly has no complaints and looks unchanged.  Her exam shows normal blood pressure, clear lungs, and a normal cardiac exam.  She had an EKG 2 months ago which is normal and an echocardiogram done a week ago which is also unremarkable and unchanged.  (See enclosed copies)\par \par She is a good surgical candidate and is cleared for her endoscopic procedure.  She will follow-up here in a year.

## 2020-10-08 NOTE — RESULTS/DATA
[] : not indicated [de-identified] : 8/11/2020 CBC reviewed, within acceptable ranges. [de-identified] : 8/11/2020 CBC reviewed, within acceptable ranges. [de-identified] : 08/10/2020 EKG reviewed, bifascicular block, no changes from prior

## 2020-10-08 NOTE — PHYSICAL EXAM
[No Edema] : there was no peripheral edema [No Extremity Clubbing/Cyanosis] : no extremity clubbing/cyanosis [Soft] : abdomen soft [Non Tender] : non-tender [Normal] : affect was normal and insight and judgment were intact

## 2020-10-08 NOTE — HISTORY OF PRESENT ILLNESS
[No Pertinent Pulmonary History] : no history of asthma, COPD, sleep apnea, or smoking [No Adverse Anesthesia Reaction] : no adverse anesthesia reaction in self or family member [(Patient denies any chest pain, claudication, dyspnea on exertion, orthopnea, palpitations or syncope)] : Patient denies any chest pain, claudication, dyspnea on exertion, orthopnea, palpitations or syncope [Moderate (4-6 METs)] : Moderate (4-6 METs) [Coronary Artery Disease] : no coronary artery disease [Recent Myocardial Infarction] : no recent myocardial infarction [Implantable Device/Pacemaker] : no implantable device/pacemaker [Chronic Kidney Disease] : no chronic kidney disease [Diabetes] : no diabetes [FreeTextEntry1] : Upper Endoscopy [FreeTextEntry2] : 10/12/2020 [FreeTextEntry3] : Dr. Kennedy [FreeTextEntry4] : KELSIE ALLRED is a 80 year old female with h/o Mendoza's Esophagus, Bifascicular Block,  Hyperlipidemia, Hypertension, OA who presents for medical optimization for proposed procedure. She offers no acute complaints today. She is very active, swims in her pool 3-4 times/week with good tolerance, denies any exertional chest pains, shortness of breath. She has undergone 3 prior endoscopies without issue per patient. [FreeTextEntry7] : EKG 8/10/20 reviewed\par ECHO 9/3/20 reviewed

## 2020-10-08 NOTE — PHYSICAL EXAM
[General Appearance - Well Developed] : well developed [Normal Appearance] : normal appearance [Well Groomed] : well groomed [General Appearance - Well Nourished] : well nourished [No Deformities] : no deformities [General Appearance - In No Acute Distress] : no acute distress [Normal Conjunctiva] : the conjunctiva exhibited no abnormalities [Eyelids - No Xanthelasma] : the eyelids demonstrated no xanthelasmas [Normal Oral Mucosa] : normal oral mucosa [No Oral Pallor] : no oral pallor [No Oral Cyanosis] : no oral cyanosis [Normal Jugular Venous A Waves Present] : normal jugular venous A waves present [Normal Jugular Venous V Waves Present] : normal jugular venous V waves present [No Jugular Venous Tenorio A Waves] : no jugular venous tenorio A waves [Respiration, Rhythm And Depth] : normal respiratory rhythm and effort [Exaggerated Use Of Accessory Muscles For Inspiration] : no accessory muscle use [Auscultation Breath Sounds / Voice Sounds] : lungs were clear to auscultation bilaterally [Heart Rate And Rhythm] : heart rate and rhythm were normal [Heart Sounds] : normal S1 and S2 [Murmurs] : no murmurs present [Abdomen Soft] : soft [Abdomen Tenderness] : non-tender [Abdomen Mass (___ Cm)] : no abdominal mass palpated [Abnormal Walk] : normal gait [Gait - Sufficient For Exercise Testing] : the gait was sufficient for exercise testing [Nail Clubbing] : no clubbing of the fingernails [Cyanosis, Localized] : no localized cyanosis [Petechial Hemorrhages (___cm)] : no petechial hemorrhages [Skin Color & Pigmentation] : normal skin color and pigmentation [] : no rash [No Venous Stasis] : no venous stasis [Skin Lesions] : no skin lesions [No Skin Ulcers] : no skin ulcer [No Xanthoma] : no  xanthoma was observed [Oriented To Time, Place, And Person] : oriented to person, place, and time [Affect] : the affect was normal [Mood] : the mood was normal [No Anxiety] : not feeling anxious

## 2020-10-09 ENCOUNTER — TRANSCRIPTION ENCOUNTER (OUTPATIENT)
Age: 80
End: 2020-10-09

## 2020-10-09 ENCOUNTER — APPOINTMENT (OUTPATIENT)
Dept: DISASTER EMERGENCY | Facility: CLINIC | Age: 80
End: 2020-10-09

## 2020-10-09 LAB — SARS-COV-2 N GENE NPH QL NAA+PROBE: NOT DETECTED

## 2020-10-10 RX ORDER — SODIUM CHLORIDE 9 MG/ML
1000 INJECTION INTRAMUSCULAR; INTRAVENOUS; SUBCUTANEOUS
Refills: 0 | Status: DISCONTINUED | OUTPATIENT
Start: 2020-10-12 | End: 2020-10-26

## 2020-10-10 NOTE — PRE PROCEDURE NOTE - PRE PROCEDURE EVALUATION
Attending Physician:          Marcia              Procedure: EGD    Indication for Procedure:  Mendoza's  ________________________________________________________  PAST MEDICAL & SURGICAL HISTORY:  GERD (gastroesophageal reflux disease)    Anxiety and depression  medically managed. Never Hospitalized.    Osteoarthritis    History of Mendoza&#x27;s esophagus  dx: &#x27; 2012    Murmur, cardiac    S/P cosmetic plastic surgery  Face    S/P tonsillectomy  age 1    S/P cataract extraction  &#x27;10   Bilateral    H/O bilateral breast reduction surgery  &#x27; 80&#x27;s    H/O abdominoplasty  &#x27; 80&#x27;s    H/O shoulder replacement  right 6/2012        Left &#x27; 2017    Fracture  &#x27; 80&#x27;s    repair of right knee with pinning    S/P appendectomy  age 8      ALLERGIES:  dust (Sneezing)  No Known Drug Allergies  penicillin (Diarrhea)    HOME MEDICATIONS:  Allegra  180 milligrams: 1 dose(s) orally once a day (at bedtime)  ALPRAZolam 0.5 mg oral tablet: 1 tab(s) orally once a day (at bedtime)  amlodipine-benazepril 5 mg-10 mg oral capsule: 1 cap(s) orally once a day  buPROPion 150 mg/24 hours (XL) oral tablet, extended release: 1 tab(s) orally once a day (at bedtime)  Lexapro 20 mg oral tablet: 1 tab(s) orally once a day (at bedtime)  Lipitor 10 mg oral tablet: 1 tab(s) orally once a day  pantoprazole 40 mg oral delayed release tablet: 1 tab(s) orally once a day (at bedtime)  sucralfate 1 g oral tablet: 1 tab(s) orally 3 times a day (before meals and at bedtime)  Valcyclovir   500 milligrams: 1 dose(s) orally once a day (at bedtime)    AICD/PPM: [ ] yes   [ ] no    PERTINENT LAB DATA:                      PHYSICAL EXAMINATION:    T(C): --  HR: --  BP: --  RR: --  SpO2: --    Constitutional: NAD  HEENT: PERRLA, EOMI,    Neck:  No JVD  Respiratory: CTAB/L  Cardiovascular: S1 and S2  Gastrointestinal: BS+, soft, NT/ND  Extremities: No peripheral edema  Neurological: A/O x 3, no focal deficits  Psychiatric: Normal mood, normal affect  Skin: No rashes    ASA Class: I [ ]  II [ ]  III [x ]  IV [ ]    COMMENTS:    The patient is a suitable candidate for the planned procedure unless box checked [ ]  No, explain:

## 2020-10-12 ENCOUNTER — APPOINTMENT (OUTPATIENT)
Dept: GASTROENTEROLOGY | Facility: HOSPITAL | Age: 80
End: 2020-10-12

## 2020-10-12 ENCOUNTER — OUTPATIENT (OUTPATIENT)
Dept: OUTPATIENT SERVICES | Facility: HOSPITAL | Age: 80
LOS: 1 days | End: 2020-10-12
Payer: MEDICARE

## 2020-10-12 VITALS
RESPIRATION RATE: 18 BRPM | HEART RATE: 70 BPM | DIASTOLIC BLOOD PRESSURE: 99 MMHG | SYSTOLIC BLOOD PRESSURE: 139 MMHG | TEMPERATURE: 98 F | WEIGHT: 151.9 LBS | HEIGHT: 63 IN | OXYGEN SATURATION: 95 %

## 2020-10-12 VITALS
HEART RATE: 63 BPM | DIASTOLIC BLOOD PRESSURE: 92 MMHG | OXYGEN SATURATION: 95 % | RESPIRATION RATE: 16 BRPM | SYSTOLIC BLOOD PRESSURE: 143 MMHG

## 2020-10-12 DIAGNOSIS — K22.711 BARRETT'S ESOPHAGUS WITH HIGH GRADE DYSPLASIA: ICD-10-CM

## 2020-10-12 DIAGNOSIS — Z98.890 OTHER SPECIFIED POSTPROCEDURAL STATES: Chronic | ICD-10-CM

## 2020-10-12 PROCEDURE — 43270 EGD LESION ABLATION: CPT | Mod: GC

## 2020-10-12 PROCEDURE — 43270 EGD LESION ABLATION: CPT

## 2020-10-12 RX ORDER — SUCRALFATE 1 G
1 TABLET ORAL ONCE
Refills: 0 | Status: COMPLETED | OUTPATIENT
Start: 2020-10-12 | End: 2020-10-12

## 2020-10-12 RX ORDER — DIPHENHYDRAMINE HYDROCHLORIDE AND LIDOCAINE HYDROCHLORIDE AND ALUMINUM HYDROXIDE AND MAGNESIUM HYDRO
15 KIT ONCE
Refills: 0 | Status: COMPLETED | OUTPATIENT
Start: 2020-10-12 | End: 2020-10-12

## 2020-10-12 RX ORDER — ACETAMINOPHEN 500 MG
1000 TABLET ORAL ONCE
Refills: 0 | Status: COMPLETED | OUTPATIENT
Start: 2020-10-12 | End: 2020-10-12

## 2020-10-12 RX ORDER — SUCRALFATE 1 G
10 TABLET ORAL
Qty: 0 | Refills: 0 | DISCHARGE
Start: 2020-10-12

## 2020-10-12 RX ORDER — SUCRALFATE 1 G
1 TABLET ORAL
Qty: 0 | Refills: 0 | DISCHARGE

## 2020-10-12 RX ADMIN — Medication 400 MILLIGRAM(S): at 13:15

## 2020-10-12 RX ADMIN — SODIUM CHLORIDE 30 MILLILITER(S): 9 INJECTION INTRAMUSCULAR; INTRAVENOUS; SUBCUTANEOUS at 11:54

## 2020-10-12 RX ADMIN — DIPHENHYDRAMINE HYDROCHLORIDE AND LIDOCAINE HYDROCHLORIDE AND ALUMINUM HYDROXIDE AND MAGNESIUM HYDRO 15 MILLILITER(S): KIT at 13:25

## 2020-10-12 RX ADMIN — Medication 1 GRAM(S): at 13:25

## 2020-10-12 NOTE — ASU DISCHARGE PLAN (ADULT/PEDIATRIC) - NSDISCH_DIET_ENDO_ALL_CORE_FT
You should resume your previous diet unless otherwise instructed by your doctor. Do not drink alcoholic beverages for the next 24 hours. Follow post ablation instruction sheet

## 2020-11-02 ENCOUNTER — APPOINTMENT (OUTPATIENT)
Dept: INTERNAL MEDICINE | Facility: CLINIC | Age: 80
End: 2020-11-02
Payer: MEDICARE

## 2020-11-02 VITALS
HEART RATE: 72 BPM | RESPIRATION RATE: 16 BRPM | DIASTOLIC BLOOD PRESSURE: 80 MMHG | WEIGHT: 155 LBS | SYSTOLIC BLOOD PRESSURE: 130 MMHG | BODY MASS INDEX: 26.61 KG/M2

## 2020-11-02 PROCEDURE — 99214 OFFICE O/P EST MOD 30 MIN: CPT

## 2020-11-02 NOTE — HISTORY OF PRESENT ILLNESS
[FreeTextEntry1] : FU for hypertension, hyperlipidemia, Barrets, Anxiety, Low Vit D\par Watching diet\par Exercising - swimming and walking

## 2020-11-02 NOTE — PHYSICAL EXAM
[Normal Sclera/Conjunctiva] : normal sclera/conjunctiva [No JVD] : no jugular venous distention [No Lymphadenopathy] : no lymphadenopathy [Supple] : supple [Normal] : normal rate, regular rhythm, normal S1 and S2 and no murmur heard [No Edema] : there was no peripheral edema [Soft] : abdomen soft [Non Tender] : non-tender [Non-distended] : non-distended [No Masses] : no abdominal mass palpated [No HSM] : no HSM [Normal Bowel Sounds] : normal bowel sounds [Normal Supraclavicular Nodes] : no supraclavicular lymphadenopathy [Normal Posterior Cervical Nodes] : no posterior cervical lymphadenopathy [Normal Anterior Cervical Nodes] : no anterior cervical lymphadenopathy [No CVA Tenderness] : no CVA  tenderness [No Spinal Tenderness] : no spinal tenderness [No Focal Deficits] : no focal deficits [Alert and Oriented x3] : oriented to person, place, and time

## 2020-11-02 NOTE — ASSESSMENT
[FreeTextEntry1] : Pt with above medical issues.\par Labs from endocrine reviewed.\par Continue meds\par Long discussion about medical issues

## 2020-11-09 ENCOUNTER — APPOINTMENT (OUTPATIENT)
Dept: CARDIOLOGY | Facility: CLINIC | Age: 80
End: 2020-11-09

## 2020-11-12 ENCOUNTER — NON-APPOINTMENT (OUTPATIENT)
Age: 80
End: 2020-11-12

## 2020-11-13 ENCOUNTER — NON-APPOINTMENT (OUTPATIENT)
Age: 80
End: 2020-11-13

## 2020-12-08 ENCOUNTER — TRANSCRIPTION ENCOUNTER (OUTPATIENT)
Age: 80
End: 2020-12-08

## 2020-12-10 ENCOUNTER — NON-APPOINTMENT (OUTPATIENT)
Age: 80
End: 2020-12-10

## 2020-12-14 ENCOUNTER — NON-APPOINTMENT (OUTPATIENT)
Age: 80
End: 2020-12-14

## 2020-12-28 ENCOUNTER — NON-APPOINTMENT (OUTPATIENT)
Age: 80
End: 2020-12-28

## 2021-01-03 ENCOUNTER — RX RENEWAL (OUTPATIENT)
Age: 81
End: 2021-01-03

## 2021-02-06 ENCOUNTER — NON-APPOINTMENT (OUTPATIENT)
Age: 81
End: 2021-02-06

## 2021-02-08 ENCOUNTER — RX RENEWAL (OUTPATIENT)
Age: 81
End: 2021-02-08

## 2021-03-01 ENCOUNTER — APPOINTMENT (OUTPATIENT)
Dept: UROLOGY | Facility: CLINIC | Age: 81
End: 2021-03-01

## 2021-03-04 ENCOUNTER — APPOINTMENT (OUTPATIENT)
Dept: CARDIOLOGY | Facility: CLINIC | Age: 81
End: 2021-03-04
Payer: MEDICARE

## 2021-03-04 ENCOUNTER — NON-APPOINTMENT (OUTPATIENT)
Age: 81
End: 2021-03-04

## 2021-03-04 VITALS
HEART RATE: 69 BPM | DIASTOLIC BLOOD PRESSURE: 78 MMHG | BODY MASS INDEX: 26.78 KG/M2 | SYSTOLIC BLOOD PRESSURE: 120 MMHG | OXYGEN SATURATION: 96 % | WEIGHT: 156 LBS | TEMPERATURE: 97.6 F

## 2021-03-04 DIAGNOSIS — K22.711 BARRETT'S ESOPHAGUS WITH HIGH GRADE DYSPLASIA: ICD-10-CM

## 2021-03-04 PROCEDURE — 93000 ELECTROCARDIOGRAM COMPLETE: CPT

## 2021-03-04 PROCEDURE — 99214 OFFICE O/P EST MOD 30 MIN: CPT

## 2021-03-04 NOTE — HISTORY OF PRESENT ILLNESS
[FreeTextEntry1] : 80-year-old female with a history of hypertension, hypercholesterolemia, atypical chest pain, bifascicular block, mild aortic valve disease.  She continues walking and swimming on a regular basis and has no symptoms or change in her exercise capacity.  She will be having a surveillance endoscopy for Mendoza's esophagus on 3/8 and requires clearance.

## 2021-03-04 NOTE — DISCUSSION/SUMMARY
[FreeTextEntry1] : Mrs Oreilly has no complaints and looks unchanged.  Her exam shows no change in her weight, regular rhythm, normal blood pressure, clear lungs, and a normal cardiac exam.  Her EKG shows bifascicular block and is unchanged.  She is stable and I made no change in her regimen.  He is a good surgical candidate and is cleared for endoscopy as scheduled.

## 2021-03-05 ENCOUNTER — APPOINTMENT (OUTPATIENT)
Dept: DISASTER EMERGENCY | Facility: CLINIC | Age: 81
End: 2021-03-05

## 2021-03-05 LAB — SARS-COV-2 N GENE NPH QL NAA+PROBE: NOT DETECTED

## 2021-03-08 ENCOUNTER — APPOINTMENT (OUTPATIENT)
Dept: GASTROENTEROLOGY | Facility: HOSPITAL | Age: 81
End: 2021-03-08

## 2021-03-08 ENCOUNTER — OUTPATIENT (OUTPATIENT)
Dept: OUTPATIENT SERVICES | Facility: HOSPITAL | Age: 81
LOS: 1 days | End: 2021-03-08
Payer: MEDICARE

## 2021-03-08 ENCOUNTER — RESULT REVIEW (OUTPATIENT)
Age: 81
End: 2021-03-08

## 2021-03-08 VITALS
SYSTOLIC BLOOD PRESSURE: 135 MMHG | OXYGEN SATURATION: 95 % | HEIGHT: 64 IN | DIASTOLIC BLOOD PRESSURE: 87 MMHG | HEART RATE: 61 BPM | RESPIRATION RATE: 16 BRPM | TEMPERATURE: 98 F | WEIGHT: 151.9 LBS

## 2021-03-08 VITALS
DIASTOLIC BLOOD PRESSURE: 72 MMHG | RESPIRATION RATE: 16 BRPM | HEART RATE: 68 BPM | OXYGEN SATURATION: 95 % | SYSTOLIC BLOOD PRESSURE: 123 MMHG

## 2021-03-08 DIAGNOSIS — K22.719 BARRETT'S ESOPHAGUS WITH DYSPLASIA, UNSPECIFIED: ICD-10-CM

## 2021-03-08 DIAGNOSIS — K22.70 BARRETT'S ESOPHAGUS WITHOUT DYSPLASIA: ICD-10-CM

## 2021-03-08 DIAGNOSIS — Z98.890 OTHER SPECIFIED POSTPROCEDURAL STATES: Chronic | ICD-10-CM

## 2021-03-08 PROCEDURE — 43239 EGD BIOPSY SINGLE/MULTIPLE: CPT

## 2021-03-08 PROCEDURE — 43239 EGD BIOPSY SINGLE/MULTIPLE: CPT | Mod: GC

## 2021-03-08 PROCEDURE — 88305 TISSUE EXAM BY PATHOLOGIST: CPT

## 2021-03-08 PROCEDURE — 88305 TISSUE EXAM BY PATHOLOGIST: CPT | Mod: 26

## 2021-03-08 RX ORDER — SODIUM CHLORIDE 9 MG/ML
500 INJECTION INTRAMUSCULAR; INTRAVENOUS; SUBCUTANEOUS
Refills: 0 | Status: COMPLETED | OUTPATIENT
Start: 2021-03-08 | End: 2021-03-08

## 2021-03-08 RX ADMIN — SODIUM CHLORIDE 30 MILLILITER(S): 9 INJECTION INTRAMUSCULAR; INTRAVENOUS; SUBCUTANEOUS at 09:52

## 2021-03-10 LAB — SURGICAL PATHOLOGY STUDY: SIGNIFICANT CHANGE UP

## 2021-03-19 ENCOUNTER — NON-APPOINTMENT (OUTPATIENT)
Age: 81
End: 2021-03-19

## 2021-03-29 ENCOUNTER — NON-APPOINTMENT (OUTPATIENT)
Age: 81
End: 2021-03-29

## 2021-07-08 ENCOUNTER — NON-APPOINTMENT (OUTPATIENT)
Age: 81
End: 2021-07-08

## 2021-07-22 ENCOUNTER — APPOINTMENT (OUTPATIENT)
Dept: INTERNAL MEDICINE | Facility: CLINIC | Age: 81
End: 2021-07-22
Payer: MEDICARE

## 2021-07-22 VITALS
HEIGHT: 63.5 IN | BODY MASS INDEX: 27.65 KG/M2 | HEART RATE: 76 BPM | WEIGHT: 158 LBS | DIASTOLIC BLOOD PRESSURE: 80 MMHG | SYSTOLIC BLOOD PRESSURE: 130 MMHG | RESPIRATION RATE: 16 BRPM

## 2021-07-22 DIAGNOSIS — M79.18 MYALGIA, OTHER SITE: ICD-10-CM

## 2021-07-22 PROCEDURE — 99214 OFFICE O/P EST MOD 30 MIN: CPT

## 2021-07-22 NOTE — ASSESSMENT
[FreeTextEntry1] : Pt with above medical issues.\par Pt with leg pains -maybe related to exercises - to hold Atorvastatin - and to use heating pad on lower back\par To call in 2 weeks.\par Continue other meds

## 2021-07-22 NOTE — HISTORY OF PRESENT ILLNESS
[FreeTextEntry1] : FU for hypertension, hyperlipidemia, hypothyroidism\par Feels dizziness is better\par Has the left hip replaced in [place] - had pain recently and in back - \par Now with pain in both legs - backs -

## 2021-07-22 NOTE — PHYSICAL EXAM
[Normal Sclera/Conjunctiva] : normal sclera/conjunctiva [No Edema] : there was no peripheral edema [Normal] : no posterior cervical lymphadenopathy and no anterior cervical lymphadenopathy [No Joint Swelling] : no joint swelling [No Focal Deficits] : no focal deficits [Normal Gait] : normal gait [Deep Tendon Reflexes (DTR)] : deep tendon reflexes were 2+ and symmetric [de-identified] : some bilateral calf tenderness Mild srtaight leg raising on right

## 2021-09-09 ENCOUNTER — NON-APPOINTMENT (OUTPATIENT)
Age: 81
End: 2021-09-09

## 2021-09-09 ENCOUNTER — APPOINTMENT (OUTPATIENT)
Dept: INTERNAL MEDICINE | Facility: CLINIC | Age: 81
End: 2021-09-09
Payer: MEDICARE

## 2021-09-09 ENCOUNTER — APPOINTMENT (OUTPATIENT)
Dept: CARDIOLOGY | Facility: CLINIC | Age: 81
End: 2021-09-09
Payer: MEDICARE

## 2021-09-09 ENCOUNTER — LABORATORY RESULT (OUTPATIENT)
Age: 81
End: 2021-09-09

## 2021-09-09 VITALS
BODY MASS INDEX: 27.46 KG/M2 | DIASTOLIC BLOOD PRESSURE: 84 MMHG | SYSTOLIC BLOOD PRESSURE: 145 MMHG | RESPIRATION RATE: 17 BRPM | HEIGHT: 63 IN | OXYGEN SATURATION: 97 % | HEART RATE: 63 BPM | WEIGHT: 155 LBS

## 2021-09-09 VITALS
HEIGHT: 63 IN | RESPIRATION RATE: 16 BRPM | DIASTOLIC BLOOD PRESSURE: 80 MMHG | SYSTOLIC BLOOD PRESSURE: 130 MMHG | WEIGHT: 153 LBS | BODY MASS INDEX: 27.11 KG/M2 | HEART RATE: 72 BPM

## 2021-09-09 VITALS — HEIGHT: 63 IN | BODY MASS INDEX: 27.46 KG/M2 | SYSTOLIC BLOOD PRESSURE: 135 MMHG | DIASTOLIC BLOOD PRESSURE: 85 MMHG

## 2021-09-09 DIAGNOSIS — R73.09 OTHER ABNORMAL GLUCOSE: ICD-10-CM

## 2021-09-09 DIAGNOSIS — B00.9 HERPESVIRAL INFECTION, UNSPECIFIED: ICD-10-CM

## 2021-09-09 DIAGNOSIS — Z23 ENCOUNTER FOR IMMUNIZATION: ICD-10-CM

## 2021-09-09 PROCEDURE — 99214 OFFICE O/P EST MOD 30 MIN: CPT | Mod: 25

## 2021-09-09 PROCEDURE — 99214 OFFICE O/P EST MOD 30 MIN: CPT

## 2021-09-09 PROCEDURE — G0008: CPT

## 2021-09-09 PROCEDURE — G0439: CPT

## 2021-09-09 PROCEDURE — 93000 ELECTROCARDIOGRAM COMPLETE: CPT | Mod: 59

## 2021-09-09 PROCEDURE — 36415 COLL VENOUS BLD VENIPUNCTURE: CPT

## 2021-09-09 PROCEDURE — G0444 DEPRESSION SCREEN ANNUAL: CPT | Mod: 59

## 2021-09-09 PROCEDURE — 99497 ADVNCD CARE PLAN 30 MIN: CPT

## 2021-09-09 PROCEDURE — 90662 IIV NO PRSV INCREASED AG IM: CPT

## 2021-09-09 NOTE — REVIEW OF SYSTEMS
[Hearing Loss] : hearing loss [Negative] : Heme/Lymph [FreeTextEntry3] : last optho - 1 year [de-identified] : sees derm

## 2021-09-09 NOTE — HEALTH RISK ASSESSMENT
[Fair] :  ~his/her~ mood as fair [Yes] : Yes [2 - 3 times a week (3 pts)] : 2 - 3  times a week (3 points) [1 or 2 (0 pts)] : 1 or 2 (0 points) [No falls in past year] : Patient reported no falls in the past year [Never (0 pts)] : Never (0 points) [PHQ-2 Negative - No further assessment needed] : PHQ-2 Negative - No further assessment needed [Patient reported mammogram was normal] : Patient reported mammogram was normal [None] : None [With Family] : lives with family [Retired] : retired [] :  [Feels Safe at Home] : Feels safe at home [Fully functional (bathing, dressing, toileting, transferring, walking, feeding)] : Fully functional (bathing, dressing, toileting, transferring, walking, feeding) [Fully functional (using the telephone, shopping, preparing meals, housekeeping, doing laundry, using] : Fully functional and needs no help or supervision to perform IADLs (using the telephone, shopping, preparing meals, housekeeping, doing laundry, using transportation, managing medications and managing finances) [Smoke Detector] : smoke detector [Carbon Monoxide Detector] : carbon monoxide detector [Seat Belt] :  uses seat belt [Sunscreen] : uses sunscreen [With Patient/Caregiver] : , with patient/caregiver [Designated Healthcare Proxy] : Designated healthcare proxy [Name: ___] : Health Care Proxy's Name: [unfilled]  [Time Spent: ___ minutes] : Time Spent: [unfilled] minutes [] : No [de-identified] : Swim [de-identified] : So-so [Change in mental status noted] : No change in mental status noted [MammogramDate] : 09/21 [AdvancecareDate] : 09/21 [FreeTextEntry4] : Had long discussion with the patient.\par Discussed with pt the need for a health care proxy.\par Discussed who can be a proxy, forms given if needed, and the need for the proxy to know their wishes and to make sure they will comply.\par The proxy will need to have a copy in their home and the patient should have a copy.\par \par Son has a copy of proxy and to discuss wishes.

## 2021-09-09 NOTE — HISTORY OF PRESENT ILLNESS
[FreeTextEntry1] : 81-year-old female with a history of hypertension, hypercholesterolemia, bifascicular block, atypical chest pain with negative work-up, GERD with Mendoza's esophagus, anxiety.  She has been feeling generally well and has worked her swimming up to 60 laps at a time without difficulty.  She is scheduled to have surveillance for her Mendoza's esophagus on 9/20 and requires clearance.

## 2021-09-09 NOTE — REVIEW OF SYSTEMS
[Heartburn] : heartburn [Joint Pain] : joint pain [Joint Swelling] : joint swelling [Anxiety] : anxiety [Negative] : Neurological

## 2021-09-09 NOTE — PHYSICAL EXAM
[No Acute Distress] : no acute distress [Well Nourished] : well nourished [Well Developed] : well developed [Well-Appearing] : well-appearing [Normal Sclera/Conjunctiva] : normal sclera/conjunctiva [PERRL] : pupils equal round and reactive to light [EOMI] : extraocular movements intact [Normal Outer Ear/Nose] : the outer ears and nose were normal in appearance [Normal Oropharynx] : the oropharynx was normal [Normal TMs] : both tympanic membranes were normal [No JVD] : no jugular venous distention [No Lymphadenopathy] : no lymphadenopathy [Supple] : supple [Thyroid Normal, No Nodules] : the thyroid was normal and there were no nodules present [No Respiratory Distress] : no respiratory distress  [No Accessory Muscle Use] : no accessory muscle use [Clear to Auscultation] : lungs were clear to auscultation bilaterally [Normal Rate] : normal rate  [Regular Rhythm] : with a regular rhythm [Normal S1, S2] : normal S1 and S2 [No Murmur] : no murmur heard [No Carotid Bruits] : no carotid bruits [No Abdominal Bruit] : a ~M bruit was not heard ~T in the abdomen [No Varicosities] : no varicosities [Pedal Pulses Present] : the pedal pulses are present [No Edema] : there was no peripheral edema [No Palpable Aorta] : no palpable aorta [No Extremity Clubbing/Cyanosis] : no extremity clubbing/cyanosis [No Nipple Discharge] : no nipple discharge [No Axillary Lymphadenopathy] : no axillary lymphadenopathy [Soft] : abdomen soft [Non Tender] : non-tender [Non-distended] : non-distended [No Masses] : no abdominal mass palpated [No HSM] : no HSM [Normal Bowel Sounds] : normal bowel sounds [Normal Supraclavicular Nodes] : no supraclavicular lymphadenopathy [Normal Axillary Nodes] : no axillary lymphadenopathy [Normal Posterior Cervical Nodes] : no posterior cervical lymphadenopathy [Normal Anterior Cervical Nodes] : no anterior cervical lymphadenopathy [No CVA Tenderness] : no CVA  tenderness [No Spinal Tenderness] : no spinal tenderness [No Joint Swelling] : no joint swelling [Grossly Normal Strength/Tone] : grossly normal strength/tone [No Rash] : no rash [Coordination Grossly Intact] : coordination grossly intact [No Focal Deficits] : no focal deficits [Normal Gait] : normal gait [Deep Tendon Reflexes (DTR)] : deep tendon reflexes were 2+ and symmetric [Normal Affect] : the affect was normal [Normal Insight/Judgement] : insight and judgment were intact [Normal] : affect was normal and insight and judgment were intact [de-identified] : SP breast reduction [FreeTextEntry1] : Deferred to GYN [de-identified] : Deferred to GYN [de-identified] : BENNY

## 2021-09-09 NOTE — HISTORY OF PRESENT ILLNESS
[FreeTextEntry1] : Well visit and follow up for management of:\par Hypertension - on meds\par Hyperlipidemia - off meds\par Low Vit D  -on meds\par Anxiety-depression - on meds

## 2021-09-09 NOTE — ASSESSMENT
[FreeTextEntry1] : Pt with above medical issues which requires extra work in addition to the well visit.\par Bloods drawn in office.\par Meds to be adjusted\par To be better with diet.\par EKG unchanged\par \par Pt is an acceptable candidate for planned esophageal ablation\par FU after [place].

## 2021-09-09 NOTE — DISCUSSION/SUMMARY
[FreeTextEntry1] : Ms Oreilly remains physically active with no exertional symptoms and is doing generally well.  Her exam shows borderline blood pressure, clear lungs, regular rhythm, and a normal cardiac exam.  An EKG done earlier today and her internist's office shows bifascicular block with a VPC and is unchanged.\par \par She is stable and in good general health.  She is cleared for endoscopy and any associated surgical biopsies or procedures.  No change was made in her regimen.  She will follow-up again next year.

## 2021-09-10 LAB
25(OH)D3 SERPL-MCNC: 41.2 NG/ML
ALBUMIN SERPL ELPH-MCNC: 4.2 G/DL
ALP BLD-CCNC: 85 U/L
ALT SERPL-CCNC: 15 U/L
ANION GAP SERPL CALC-SCNC: 13 MMOL/L
APPEARANCE: CLEAR
AST SERPL-CCNC: 16 U/L
BASOPHILS # BLD AUTO: 0.05 K/UL
BASOPHILS NFR BLD AUTO: 1 %
BILIRUB SERPL-MCNC: 0.5 MG/DL
BILIRUBIN URINE: NEGATIVE
BLOOD URINE: NEGATIVE
BUN SERPL-MCNC: 17 MG/DL
CALCIUM SERPL-MCNC: 9.1 MG/DL
CHLORIDE SERPL-SCNC: 102 MMOL/L
CHOLEST SERPL-MCNC: 244 MG/DL
CO2 SERPL-SCNC: 26 MMOL/L
COLOR: NORMAL
CREAT SERPL-MCNC: 0.6 MG/DL
EOSINOPHIL # BLD AUTO: 0.21 K/UL
EOSINOPHIL NFR BLD AUTO: 4.3 %
ESTIMATED AVERAGE GLUCOSE: 100 MG/DL
GLUCOSE QUALITATIVE U: NEGATIVE
GLUCOSE SERPL-MCNC: 85 MG/DL
HBA1C MFR BLD HPLC: 5.1 %
HCT VFR BLD CALC: 41.5 %
HDLC SERPL-MCNC: 80 MG/DL
HGB BLD-MCNC: 14.3 G/DL
IMM GRANULOCYTES NFR BLD AUTO: 0.4 %
KETONES URINE: NEGATIVE
LDLC SERPL CALC-MCNC: 147 MG/DL
LEUKOCYTE ESTERASE URINE: ABNORMAL
LYMPHOCYTES # BLD AUTO: 1.7 K/UL
LYMPHOCYTES NFR BLD AUTO: 34.8 %
MAN DIFF?: NORMAL
MCHC RBC-ENTMCNC: 31.9 PG
MCHC RBC-ENTMCNC: 34.5 GM/DL
MCV RBC AUTO: 92.6 FL
MONOCYTES # BLD AUTO: 0.47 K/UL
MONOCYTES NFR BLD AUTO: 9.6 %
NEUTROPHILS # BLD AUTO: 2.43 K/UL
NEUTROPHILS NFR BLD AUTO: 49.9 %
NITRITE URINE: NEGATIVE
NONHDLC SERPL-MCNC: 164 MG/DL
PH URINE: 6.5
PLATELET # BLD AUTO: 198 K/UL
POTASSIUM SERPL-SCNC: 4.3 MMOL/L
PROT SERPL-MCNC: 6.3 G/DL
PROTEIN URINE: NEGATIVE
RBC # BLD: 4.48 M/UL
RBC # FLD: 14.6 %
SODIUM SERPL-SCNC: 141 MMOL/L
SPECIFIC GRAVITY URINE: 1.01
TRIGL SERPL-MCNC: 86 MG/DL
TSH SERPL-ACNC: 3.72 UIU/ML
UROBILINOGEN URINE: NORMAL
WBC # FLD AUTO: 4.88 K/UL

## 2021-09-17 ENCOUNTER — APPOINTMENT (OUTPATIENT)
Dept: DISASTER EMERGENCY | Facility: CLINIC | Age: 81
End: 2021-09-17

## 2021-09-19 LAB — SARS-COV-2 N GENE NPH QL NAA+PROBE: NOT DETECTED

## 2021-09-20 ENCOUNTER — OUTPATIENT (OUTPATIENT)
Dept: OUTPATIENT SERVICES | Facility: HOSPITAL | Age: 81
LOS: 1 days | Discharge: HOME | End: 2021-09-20
Payer: MEDICARE

## 2021-09-20 ENCOUNTER — RESULT REVIEW (OUTPATIENT)
Age: 81
End: 2021-09-20

## 2021-09-20 ENCOUNTER — APPOINTMENT (OUTPATIENT)
Dept: GASTROENTEROLOGY | Facility: HOSPITAL | Age: 81
End: 2021-09-20

## 2021-09-20 VITALS
RESPIRATION RATE: 19 BRPM | TEMPERATURE: 96 F | WEIGHT: 151.02 LBS | SYSTOLIC BLOOD PRESSURE: 124 MMHG | HEIGHT: 64 IN | OXYGEN SATURATION: 94 % | HEART RATE: 56 BPM | DIASTOLIC BLOOD PRESSURE: 69 MMHG

## 2021-09-20 VITALS
OXYGEN SATURATION: 97 % | DIASTOLIC BLOOD PRESSURE: 74 MMHG | SYSTOLIC BLOOD PRESSURE: 118 MMHG | HEART RATE: 57 BPM | RESPIRATION RATE: 16 BRPM

## 2021-09-20 DIAGNOSIS — Z98.890 OTHER SPECIFIED POSTPROCEDURAL STATES: Chronic | ICD-10-CM

## 2021-09-20 DIAGNOSIS — K22.70 BARRETT'S ESOPHAGUS WITHOUT DYSPLASIA: ICD-10-CM

## 2021-09-20 PROCEDURE — 43239 EGD BIOPSY SINGLE/MULTIPLE: CPT | Mod: GC

## 2021-09-20 PROCEDURE — 88305 TISSUE EXAM BY PATHOLOGIST: CPT | Mod: 26

## 2021-09-20 NOTE — ASU PATIENT PROFILE, ADULT - NSICDXPASTSURGICALHX_GEN_ALL_CORE_FT
PAST SURGICAL HISTORY:  Fracture ' 80's    repair of right knee with pinning    H/O abdominoplasty ' 80's    H/O bilateral breast reduction surgery ' 80's    H/O shoulder replacement right 6/2012        Left ' 2017    S/P appendectomy age 8    S/P cataract extraction '10   Bilateral    S/P cosmetic plastic surgery Face    S/P tonsillectomy age 1

## 2021-09-20 NOTE — ASU PATIENT PROFILE, ADULT - NSICDXPASTMEDICALHX_GEN_ALL_CORE_FT
PAST MEDICAL HISTORY:  Anxiety and depression medically managed. Never Hospitalized.    GERD (gastroesophageal reflux disease)     History of Mendoza's esophagus dx: ' 2012    Murmur, cardiac     Osteoarthritis

## 2021-11-04 ENCOUNTER — NON-APPOINTMENT (OUTPATIENT)
Age: 81
End: 2021-11-04

## 2021-11-05 ENCOUNTER — NON-APPOINTMENT (OUTPATIENT)
Age: 81
End: 2021-11-05

## 2021-11-09 ENCOUNTER — NON-APPOINTMENT (OUTPATIENT)
Age: 81
End: 2021-11-09

## 2021-11-18 ENCOUNTER — NON-APPOINTMENT (OUTPATIENT)
Age: 81
End: 2021-11-18

## 2021-11-23 ENCOUNTER — NON-APPOINTMENT (OUTPATIENT)
Age: 81
End: 2021-11-23

## 2021-12-01 ENCOUNTER — NON-APPOINTMENT (OUTPATIENT)
Age: 81
End: 2021-12-01

## 2021-12-09 ENCOUNTER — NON-APPOINTMENT (OUTPATIENT)
Age: 81
End: 2021-12-09

## 2022-01-11 ENCOUNTER — NON-APPOINTMENT (OUTPATIENT)
Age: 82
End: 2022-01-11

## 2022-01-26 ENCOUNTER — NON-APPOINTMENT (OUTPATIENT)
Age: 82
End: 2022-01-26

## 2022-03-09 ENCOUNTER — APPOINTMENT (OUTPATIENT)
Dept: GASTROENTEROLOGY | Facility: CLINIC | Age: 82
End: 2022-03-09
Payer: MEDICARE

## 2022-03-09 PROCEDURE — 99442: CPT | Mod: 95

## 2022-03-29 ENCOUNTER — RX RENEWAL (OUTPATIENT)
Age: 82
End: 2022-03-29

## 2022-05-01 NOTE — PRE PROCEDURE NOTE - PRE PROCEDURE EVALUATION
Attending Physician:         Marcia                   Procedure: EGD    Indication for Procedure: Mendoza's  ________________________________________________________  PAST MEDICAL & SURGICAL HISTORY:  GERD (gastroesophageal reflux disease)    Anxiety and depression  medically managed. Never Hospitalized.    Osteoarthritis    History of Mendoza&#x27;s esophagus  dx: &#x27; 2012    Murmur, cardiac    S/P cosmetic plastic surgery  Face    S/P tonsillectomy  age 1    S/P cataract extraction  &#x27;10   Bilateral    H/O bilateral breast reduction surgery  &#x27; 80&#x27;s    H/O abdominoplasty  &#x27; 80&#x27;s    H/O shoulder replacement  right 6/2012        Left &#x27; 2017    Fracture  &#x27; 80&#x27;s    repair of right knee with pinning    S/P appendectomy  age 8      ALLERGIES:  dust (Sneezing)  No Known Drug Allergies  penicillin (Diarrhea)    HOME MEDICATIONS:  Allegra  180 milligrams: 1 dose(s) orally once a day (at bedtime)  ALPRAZolam 0.5 mg oral tablet: 1 tab(s) orally once a day (at bedtime)  amlodipine-benazepril 5 mg-10 mg oral capsule: 1 cap(s) orally once a day  buPROPion 150 mg/24 hours (XL) oral tablet, extended release: 1 tab(s) orally once a day (at bedtime)  Lexapro 20 mg oral tablet: 1 tab(s) orally once a day (at bedtime)  Lipitor 10 mg oral tablet: 1 tab(s) orally once a day  pantoprazole 40 mg oral delayed release tablet: 1 tab(s) orally once a day (at bedtime)  sucralfate 1 g/10 mL oral suspension: 10 milliliter(s) orally once  Valcyclovir   500 milligrams: 1 dose(s) orally once a day (at bedtime)    AICD/PPM: [ ] yes   [ ] no    PERTINENT LAB DATA:                      PHYSICAL EXAMINATION:    T(C): --  HR: --  BP: --  RR: --  SpO2: --    Constitutional: NAD  HEENT: PERRLA, EOMI,    Neck:  No JVD  Respiratory: CTAB/L  Cardiovascular: S1 and S2  Gastrointestinal: BS+, soft, NT/ND  Extremities: No peripheral edema  Neurological: A/O x 3, no focal deficits  Psychiatric: Normal mood, normal affect  Skin: No rashes    ASA Class: I [ ]  II [ ]  III [ ]  IV [ ]    COMMENTS:    The patient is a suitable candidate for the planned procedure unless box checked [ ]  No, explain:    
01-May-2022 07:40

## 2022-05-20 ENCOUNTER — NON-APPOINTMENT (OUTPATIENT)
Age: 82
End: 2022-05-20

## 2022-05-29 ENCOUNTER — TRANSCRIPTION ENCOUNTER (OUTPATIENT)
Age: 82
End: 2022-05-29

## 2022-06-03 ENCOUNTER — APPOINTMENT (OUTPATIENT)
Dept: CARDIOLOGY | Facility: CLINIC | Age: 82
End: 2022-06-03
Payer: MEDICARE

## 2022-06-03 ENCOUNTER — NON-APPOINTMENT (OUTPATIENT)
Age: 82
End: 2022-06-03

## 2022-06-03 VITALS
WEIGHT: 157 LBS | BODY MASS INDEX: 27.81 KG/M2 | DIASTOLIC BLOOD PRESSURE: 98 MMHG | OXYGEN SATURATION: 97 % | HEART RATE: 75 BPM | SYSTOLIC BLOOD PRESSURE: 150 MMHG

## 2022-06-03 VITALS — DIASTOLIC BLOOD PRESSURE: 80 MMHG | SYSTOLIC BLOOD PRESSURE: 130 MMHG

## 2022-06-03 PROCEDURE — 93000 ELECTROCARDIOGRAM COMPLETE: CPT

## 2022-06-03 PROCEDURE — 99214 OFFICE O/P EST MOD 30 MIN: CPT

## 2022-06-03 NOTE — DISCUSSION/SUMMARY
[FreeTextEntry1] : Ms Oreilly is an 81-year-old female scheduled for elective endoscopy to evaluate Mendoza's esophagus next week.  Her exam shows normal repeat blood pressure, regular rhythm, clear lungs, and a normal cardiac exam.  Her EKG shows bifascicular block and is unchanged.\par \par She is stable and in good general health.  She is cleared to proceed with her endoscopy as scheduled.

## 2022-06-03 NOTE — HISTORY OF PRESENT ILLNESS
[FreeTextEntry1] : 81-year-old female being seen in preop evaluation prior for endoscopy scheduled for 617.  She has a history of hypertension, hypercholesterolemia, and has had atypical chest pain with negative work-ups in the past.  She has a history of Mendoza's esophagus and has had multiple endoscopies and biopsies in the past without incident.  She has no current complaints.

## 2022-06-13 ENCOUNTER — OUTPATIENT (OUTPATIENT)
Dept: OUTPATIENT SERVICES | Facility: HOSPITAL | Age: 82
LOS: 1 days | Discharge: HOME | End: 2022-06-13
Payer: MEDICARE

## 2022-06-13 ENCOUNTER — RESULT REVIEW (OUTPATIENT)
Age: 82
End: 2022-06-13

## 2022-06-13 ENCOUNTER — APPOINTMENT (OUTPATIENT)
Dept: GASTROENTEROLOGY | Facility: HOSPITAL | Age: 82
End: 2022-06-13

## 2022-06-13 VITALS
HEIGHT: 64 IN | SYSTOLIC BLOOD PRESSURE: 144 MMHG | RESPIRATION RATE: 18 BRPM | DIASTOLIC BLOOD PRESSURE: 80 MMHG | OXYGEN SATURATION: 98 % | WEIGHT: 151.9 LBS | TEMPERATURE: 97 F | HEART RATE: 63 BPM

## 2022-06-13 VITALS
OXYGEN SATURATION: 97 % | SYSTOLIC BLOOD PRESSURE: 163 MMHG | DIASTOLIC BLOOD PRESSURE: 95 MMHG | HEART RATE: 66 BPM | RESPIRATION RATE: 14 BRPM

## 2022-06-13 DIAGNOSIS — K22.70 BARRETT'S ESOPHAGUS WITHOUT DYSPLASIA: ICD-10-CM

## 2022-06-13 DIAGNOSIS — Z98.890 OTHER SPECIFIED POSTPROCEDURAL STATES: Chronic | ICD-10-CM

## 2022-06-13 PROCEDURE — 43239 EGD BIOPSY SINGLE/MULTIPLE: CPT | Mod: GC

## 2022-06-13 PROCEDURE — 88305 TISSUE EXAM BY PATHOLOGIST: CPT | Mod: 26

## 2022-06-13 NOTE — ASU PATIENT PROFILE, ADULT - FALL HARM RISK - UNIVERSAL INTERVENTIONS
Bed in lowest position, wheels locked, appropriate side rails in place/Call bell, personal items and telephone in reach/Instruct patient to call for assistance before getting out of bed or chair/Non-slip footwear when patient is out of bed/[place] to call system/Physically safe environment - no spills, clutter or unnecessary equipment/Purposeful Proactive Rounding/Room/bathroom lighting operational, light cord in reach

## 2022-08-18 ENCOUNTER — NON-APPOINTMENT (OUTPATIENT)
Age: 82
End: 2022-08-18

## 2022-08-25 ENCOUNTER — NON-APPOINTMENT (OUTPATIENT)
Age: 82
End: 2022-08-25

## 2022-09-13 ENCOUNTER — APPOINTMENT (OUTPATIENT)
Dept: INTERNAL MEDICINE | Facility: CLINIC | Age: 82
End: 2022-09-13

## 2022-09-13 ENCOUNTER — NON-APPOINTMENT (OUTPATIENT)
Age: 82
End: 2022-09-13

## 2022-09-13 ENCOUNTER — LABORATORY RESULT (OUTPATIENT)
Age: 82
End: 2022-09-13

## 2022-09-13 VITALS — SYSTOLIC BLOOD PRESSURE: 135 MMHG | RESPIRATION RATE: 16 BRPM | HEART RATE: 76 BPM | DIASTOLIC BLOOD PRESSURE: 80 MMHG

## 2022-09-13 DIAGNOSIS — M81.0 AGE-RELATED OSTEOPOROSIS W/OUT CURRENT PATHOLOGICAL FRACTURE: ICD-10-CM

## 2022-09-13 DIAGNOSIS — R41.3 OTHER AMNESIA: ICD-10-CM

## 2022-09-13 DIAGNOSIS — R05.9 COUGH, UNSPECIFIED: ICD-10-CM

## 2022-09-13 PROCEDURE — 99214 OFFICE O/P EST MOD 30 MIN: CPT | Mod: 25

## 2022-09-13 PROCEDURE — 93000 ELECTROCARDIOGRAM COMPLETE: CPT | Mod: 59

## 2022-09-13 PROCEDURE — G0439: CPT

## 2022-09-13 PROCEDURE — 99497 ADVNCD CARE PLAN 30 MIN: CPT

## 2022-09-13 PROCEDURE — 36415 COLL VENOUS BLD VENIPUNCTURE: CPT

## 2022-09-13 RX ORDER — LEVOTHYROXINE SODIUM 75 UG/1
75 TABLET ORAL
Qty: 90 | Refills: 2 | Status: ACTIVE | COMMUNITY
Start: 2020-11-02

## 2022-09-13 RX ORDER — SUCRALFATE 1 G/1
1 TABLET ORAL
Qty: 90 | Refills: 2 | Status: DISCONTINUED | COMMUNITY
Start: 2019-09-18 | End: 2022-09-13

## 2022-09-13 NOTE — REVIEW OF SYSTEMS
[Hearing Loss] : hearing loss [Cough] : cough [Memory Loss] : memory loss [Negative] : Heme/Lymph [FreeTextEntry3] : last optho - 1 year

## 2022-09-13 NOTE — HISTORY OF PRESENT ILLNESS
[FreeTextEntry1] : Well visit and follow up for management of:\par Hypertension - on meds\par Low Vit D - on meds\par GERD  -on meds\par Hypothyroidism - on meds

## 2022-09-13 NOTE — PHYSICAL EXAM
[No Acute Distress] : no acute distress [Well Nourished] : well nourished [Well Developed] : well developed [Well-Appearing] : well-appearing [Normal Sclera/Conjunctiva] : normal sclera/conjunctiva [PERRL] : pupils equal round and reactive to light [EOMI] : extraocular movements intact [Normal Outer Ear/Nose] : the outer ears and nose were normal in appearance [Normal Oropharynx] : the oropharynx was normal [Normal TMs] : both tympanic membranes were normal [No JVD] : no jugular venous distention [No Lymphadenopathy] : no lymphadenopathy [Supple] : supple [Thyroid Normal, No Nodules] : the thyroid was normal and there were no nodules present [No Respiratory Distress] : no respiratory distress  [No Accessory Muscle Use] : no accessory muscle use [Clear to Auscultation] : lungs were clear to auscultation bilaterally [Normal Rate] : normal rate  [Regular Rhythm] : with a regular rhythm [Normal S1, S2] : normal S1 and S2 [No Murmur] : no murmur heard [No Carotid Bruits] : no carotid bruits [No Abdominal Bruit] : a ~M bruit was not heard ~T in the abdomen [No Varicosities] : no varicosities [Pedal Pulses Present] : the pedal pulses are present [No Edema] : there was no peripheral edema [No Palpable Aorta] : no palpable aorta [No Extremity Clubbing/Cyanosis] : no extremity clubbing/cyanosis [No Nipple Discharge] : no nipple discharge [No Axillary Lymphadenopathy] : no axillary lymphadenopathy [Soft] : abdomen soft [Non Tender] : non-tender [Non-distended] : non-distended [No Masses] : no abdominal mass palpated [No HSM] : no HSM [Normal Bowel Sounds] : normal bowel sounds [Normal Supraclavicular Nodes] : no supraclavicular lymphadenopathy [Normal Axillary Nodes] : no axillary lymphadenopathy [Normal Posterior Cervical Nodes] : no posterior cervical lymphadenopathy [Normal Anterior Cervical Nodes] : no anterior cervical lymphadenopathy [No CVA Tenderness] : no CVA  tenderness [No Spinal Tenderness] : no spinal tenderness [No Joint Swelling] : no joint swelling [Grossly Normal Strength/Tone] : grossly normal strength/tone [No Rash] : no rash [Coordination Grossly Intact] : coordination grossly intact [No Focal Deficits] : no focal deficits [Normal Gait] : normal gait [Deep Tendon Reflexes (DTR)] : deep tendon reflexes were 2+ and symmetric [Normal Affect] : the affect was normal [Alert and Oriented x3] : oriented to person, place, and time [Normal Insight/Judgement] : insight and judgment were intact [de-identified] : SP bilateral reduction [FreeTextEntry1] : Deferred to GYN [de-identified] : Deferred to GYN [de-identified] : BENNY [de-identified] : Serial 7's to 79 but with difficulty  3/3 objects at 5 min.  Got nickels in $1.25 but with difficulty

## 2022-09-13 NOTE — HEALTH RISK ASSESSMENT
[Fair] :  ~his/her~ mood as fair [Former] : Former [Yes] : Yes [2 - 3 times a week (3 pts)] : 2 - 3  times a week (3 points) [1 or 2 (0 pts)] : 1 or 2 (0 points) [Never (0 pts)] : Never (0 points) [No falls in past year] : Patient reported no falls in the past year [None] : None [With Family] : lives with family [Retired] : retired [] :  [Feels Safe at Home] : Feels safe at home [Fully functional (bathing, dressing, toileting, transferring, walking, feeding)] : Fully functional (bathing, dressing, toileting, transferring, walking, feeding) [Fully functional (using the telephone, shopping, preparing meals, housekeeping, doing laundry, using] : Fully functional and needs no help or supervision to perform IADLs (using the telephone, shopping, preparing meals, housekeeping, doing laundry, using transportation, managing medications and managing finances) [Smoke Detector] : smoke detector [Carbon Monoxide Detector] : carbon monoxide detector [Seat Belt] :  uses seat belt [Sunscreen] : uses sunscreen [With Patient/Caregiver] : , with patient/caregiver [Designated Healthcare Proxy] : Designated healthcare proxy [Name: ___] : Health Care Proxy's Name: [unfilled]  [Time Spent: ___ minutes] : Time Spent: [unfilled] minutes [YearQuit] : Age 44 [de-identified] : Swim, walking [de-identified] : So-so [Change in mental status noted] : No change in mental status noted [AdvancecareDate] : 09/22 [FreeTextEntry4] : Had long discussion with the patient.\par Discussed with pt the need for a health care proxy.\par Discussed who can be a proxy, forms given if needed, and the need for the proxy to know their wishes and to make sure they will comply.\par The proxy will need to have a copy in their home and the patient should have a copy.\par \par Proxy is in the house- wishes are known

## 2022-09-13 NOTE — ASSESSMENT
[FreeTextEntry1] : Pt with above medical issues which requires extra work in addition to the well visit.\par Bloods drawn in office.\par Meds to be adjusted\par Pt to do fu Chest CT \par Pt had recent Covid infection and may have some brain fog. - will get MRI brain\par Health counseling given.\par \par

## 2022-09-14 ENCOUNTER — NON-APPOINTMENT (OUTPATIENT)
Age: 82
End: 2022-09-14

## 2022-09-15 ENCOUNTER — NON-APPOINTMENT (OUTPATIENT)
Age: 82
End: 2022-09-15

## 2022-09-15 LAB
25(OH)D3 SERPL-MCNC: 37.8 NG/ML
ALBUMIN SERPL ELPH-MCNC: 4.4 G/DL
ALP BLD-CCNC: 82 U/L
ALT SERPL-CCNC: 20 U/L
ANION GAP SERPL CALC-SCNC: 13 MMOL/L
APPEARANCE: CLEAR
AST SERPL-CCNC: 17 U/L
BASOPHILS # BLD AUTO: 0.03 K/UL
BASOPHILS NFR BLD AUTO: 0.6 %
BILIRUB SERPL-MCNC: 0.6 MG/DL
BILIRUBIN URINE: NEGATIVE
BLOOD URINE: NEGATIVE
BUN SERPL-MCNC: 22 MG/DL
CALCIUM SERPL-MCNC: 9.5 MG/DL
CHLORIDE SERPL-SCNC: 102 MMOL/L
CHOLEST SERPL-MCNC: 259 MG/DL
CO2 SERPL-SCNC: 26 MMOL/L
COLOR: YELLOW
CREAT SERPL-MCNC: 0.55 MG/DL
EGFR: 91 ML/MIN/1.73M2
EOSINOPHIL # BLD AUTO: 0.2 K/UL
EOSINOPHIL NFR BLD AUTO: 3.9 %
ESTIMATED AVERAGE GLUCOSE: 103 MG/DL
GLUCOSE QUALITATIVE U: NEGATIVE
GLUCOSE SERPL-MCNC: 93 MG/DL
HBA1C MFR BLD HPLC: 5.2 %
HCT VFR BLD CALC: 43.1 %
HDLC SERPL-MCNC: 76 MG/DL
HGB BLD-MCNC: 14.5 G/DL
IMM GRANULOCYTES NFR BLD AUTO: 0.2 %
KETONES URINE: NEGATIVE
LDLC SERPL CALC-MCNC: 167 MG/DL
LEUKOCYTE ESTERASE URINE: ABNORMAL
LYMPHOCYTES # BLD AUTO: 1.87 K/UL
LYMPHOCYTES NFR BLD AUTO: 36.9 %
MAN DIFF?: NORMAL
MCHC RBC-ENTMCNC: 32 PG
MCHC RBC-ENTMCNC: 33.6 GM/DL
MCV RBC AUTO: 95.1 FL
MONOCYTES # BLD AUTO: 0.43 K/UL
MONOCYTES NFR BLD AUTO: 8.5 %
NEUTROPHILS # BLD AUTO: 2.53 K/UL
NEUTROPHILS NFR BLD AUTO: 49.9 %
NITRITE URINE: NEGATIVE
NONHDLC SERPL-MCNC: 183 MG/DL
PH URINE: 6.5
PLATELET # BLD AUTO: 233 K/UL
POTASSIUM SERPL-SCNC: 4.3 MMOL/L
PROT SERPL-MCNC: 6.5 G/DL
PROTEIN URINE: NORMAL
RBC # BLD: 4.53 M/UL
RBC # FLD: 12.9 %
SODIUM SERPL-SCNC: 142 MMOL/L
SPECIFIC GRAVITY URINE: 1.02
T PALLIDUM AB SER QL IA: NEGATIVE
TRIGL SERPL-MCNC: 83 MG/DL
TSH SERPL-ACNC: 1.6 UIU/ML
UROBILINOGEN URINE: NORMAL
VIT B12 SERPL-MCNC: 631 PG/ML
WBC # FLD AUTO: 5.07 K/UL

## 2022-10-04 ENCOUNTER — NON-APPOINTMENT (OUTPATIENT)
Age: 82
End: 2022-10-04

## 2022-10-05 ENCOUNTER — OUTPATIENT (OUTPATIENT)
Dept: OUTPATIENT SERVICES | Facility: HOSPITAL | Age: 82
LOS: 1 days | End: 2022-10-05
Payer: MEDICARE

## 2022-10-05 ENCOUNTER — APPOINTMENT (OUTPATIENT)
Dept: MRI IMAGING | Facility: IMAGING CENTER | Age: 82
End: 2022-10-05

## 2022-10-05 ENCOUNTER — APPOINTMENT (OUTPATIENT)
Dept: CT IMAGING | Facility: IMAGING CENTER | Age: 82
End: 2022-10-05

## 2022-10-05 DIAGNOSIS — R41.3 OTHER AMNESIA: ICD-10-CM

## 2022-10-05 DIAGNOSIS — I71.20 THORACIC AORTIC ANEURYSM, WITHOUT RUPTURE, UNSPECIFIED: ICD-10-CM

## 2022-10-05 DIAGNOSIS — R05.9 COUGH, UNSPECIFIED: ICD-10-CM

## 2022-10-05 DIAGNOSIS — Z98.890 OTHER SPECIFIED POSTPROCEDURAL STATES: Chronic | ICD-10-CM

## 2022-10-05 PROCEDURE — 70551 MRI BRAIN STEM W/O DYE: CPT

## 2022-10-05 PROCEDURE — 70551 MRI BRAIN STEM W/O DYE: CPT | Mod: 26,MH

## 2022-10-05 PROCEDURE — 71260 CT THORAX DX C+: CPT

## 2022-10-05 PROCEDURE — 71260 CT THORAX DX C+: CPT | Mod: 26,MH

## 2022-10-06 ENCOUNTER — NON-APPOINTMENT (OUTPATIENT)
Age: 82
End: 2022-10-06

## 2022-10-11 ENCOUNTER — NON-APPOINTMENT (OUTPATIENT)
Age: 82
End: 2022-10-11

## 2022-11-02 ENCOUNTER — LABORATORY RESULT (OUTPATIENT)
Age: 82
End: 2022-11-02

## 2022-11-10 ENCOUNTER — NON-APPOINTMENT (OUTPATIENT)
Age: 82
End: 2022-11-10

## 2022-11-22 ENCOUNTER — NON-APPOINTMENT (OUTPATIENT)
Age: 82
End: 2022-11-22

## 2023-01-31 ENCOUNTER — RX RENEWAL (OUTPATIENT)
Age: 83
End: 2023-01-31

## 2023-02-24 ENCOUNTER — NON-APPOINTMENT (OUTPATIENT)
Age: 83
End: 2023-02-24

## 2023-04-05 ENCOUNTER — RX RENEWAL (OUTPATIENT)
Age: 83
End: 2023-04-05

## 2023-04-05 RX ORDER — ATORVASTATIN CALCIUM 10 MG/1
10 TABLET, FILM COATED ORAL
Qty: 90 | Refills: 0 | Status: ACTIVE | COMMUNITY
Start: 2023-04-05 | End: 1900-01-01

## 2023-04-18 ENCOUNTER — NON-APPOINTMENT (OUTPATIENT)
Age: 83
End: 2023-04-18

## 2023-06-05 ENCOUNTER — NON-APPOINTMENT (OUTPATIENT)
Age: 83
End: 2023-06-05

## 2023-06-05 ENCOUNTER — APPOINTMENT (OUTPATIENT)
Dept: CARDIOLOGY | Facility: CLINIC | Age: 83
End: 2023-06-05
Payer: MEDICARE

## 2023-06-05 VITALS
DIASTOLIC BLOOD PRESSURE: 80 MMHG | BODY MASS INDEX: 26.93 KG/M2 | HEART RATE: 65 BPM | OXYGEN SATURATION: 98 % | HEIGHT: 63 IN | WEIGHT: 152 LBS | SYSTOLIC BLOOD PRESSURE: 112 MMHG

## 2023-06-05 DIAGNOSIS — K22.70 BARRETT'S ESOPHAGUS W/OUT DYSPLASIA: ICD-10-CM

## 2023-06-05 DIAGNOSIS — I45.2 BIFASCICULAR BLOCK: ICD-10-CM

## 2023-06-05 PROCEDURE — 93000 ELECTROCARDIOGRAM COMPLETE: CPT

## 2023-06-05 PROCEDURE — 99214 OFFICE O/P EST MOD 30 MIN: CPT

## 2023-06-05 NOTE — DISCUSSION/SUMMARY
[FreeTextEntry1] : Ms Oreilly is feeling well with no chest pain or other symptoms.  Her exam reveals a weight of 152 (decreased 5 pounds), regular rhythm, normal blood pressure, clear lungs, and a normal cardiac exam.  Her EKG shows shows bifascicular block and is unchanged.\par \par She has no evidence of heart disease and is stable.  She is cleared to proceed with endoscopy as scheduled.  She will follow-up here in 6 months. [EKG obtained to assist in diagnosis and management of assessed problem(s)] : EKG obtained to assist in diagnosis and management of assessed problem(s)

## 2023-06-05 NOTE — HISTORY OF PRESENT ILLNESS
[FreeTextEntry1] : 82-year-old female with a history of hypertension, hypercholesterolemia, and a chest pain syndrome which is apparently related to reflux and a Mendoza's esophagus.  She was last seen a year ago.  She has been feeling generally well with no chest pain and maintaining normal activities.\par \par She is lost a few pounds.  Her home blood pressures have been normal averaging about 130/80.  Her most recent LDL cholesterol was 108 on Lipitor 10.\par \par She will have surveillance endoscopy for her Mendoza's esophagus next weeks week and requires clearance.

## 2023-06-07 RX ORDER — SODIUM CHLORIDE 9 MG/ML
500 INJECTION, SOLUTION INTRAVENOUS
Refills: 0 | Status: DISCONTINUED | OUTPATIENT
Start: 2023-06-12 | End: 2023-06-26

## 2023-06-09 NOTE — ASU PATIENT PROFILE, ADULT - FALL HARM RISK - UNIVERSAL INTERVENTIONS
Bed in lowest position, wheels locked, appropriate side rails in place/Call bell, personal items and telephone in reach/Instruct patient to call for assistance before getting out of bed or chair/Non-slip footwear when patient is out of bed/[place] to call system/Physically safe environment - no spills, clutter or unnecessary equipment/Purposeful Proactive Rounding/Room/bathroom lighting operational, light cord in reach

## 2023-06-12 ENCOUNTER — APPOINTMENT (OUTPATIENT)
Dept: GASTROENTEROLOGY | Facility: HOSPITAL | Age: 83
End: 2023-06-12

## 2023-06-12 ENCOUNTER — RESULT REVIEW (OUTPATIENT)
Age: 83
End: 2023-06-12

## 2023-06-12 ENCOUNTER — OUTPATIENT (OUTPATIENT)
Dept: OUTPATIENT SERVICES | Facility: HOSPITAL | Age: 83
LOS: 1 days | Discharge: HOME | End: 2023-06-12
Payer: MEDICARE

## 2023-06-12 ENCOUNTER — TRANSCRIPTION ENCOUNTER (OUTPATIENT)
Age: 83
End: 2023-06-12

## 2023-06-12 VITALS
OXYGEN SATURATION: 100 % | SYSTOLIC BLOOD PRESSURE: 139 MMHG | RESPIRATION RATE: 20 BRPM | DIASTOLIC BLOOD PRESSURE: 82 MMHG | HEART RATE: 64 BPM

## 2023-06-12 VITALS
RESPIRATION RATE: 16 BRPM | HEIGHT: 63 IN | DIASTOLIC BLOOD PRESSURE: 76 MMHG | SYSTOLIC BLOOD PRESSURE: 134 MMHG | HEART RATE: 55 BPM | WEIGHT: 151.02 LBS | OXYGEN SATURATION: 95 % | TEMPERATURE: 98 F

## 2023-06-12 DIAGNOSIS — Z98.890 OTHER SPECIFIED POSTPROCEDURAL STATES: Chronic | ICD-10-CM

## 2023-06-12 DIAGNOSIS — K22.70 BARRETT'S ESOPHAGUS WITHOUT DYSPLASIA: ICD-10-CM

## 2023-06-12 PROCEDURE — 88305 TISSUE EXAM BY PATHOLOGIST: CPT | Mod: 26

## 2023-06-12 PROCEDURE — 43239 EGD BIOPSY SINGLE/MULTIPLE: CPT | Mod: GC

## 2023-06-12 NOTE — ASU DISCHARGE PLAN (ADULT/PEDIATRIC) - NS MD DC FALL RISK RISK
For information on Fall & Injury Prevention, visit: [email or web address] OR  [email or web address] OR  https://www.cdc.gov/steadi/patient.html

## 2023-06-20 ENCOUNTER — NON-APPOINTMENT (OUTPATIENT)
Age: 83
End: 2023-06-20

## 2023-06-21 ENCOUNTER — NON-APPOINTMENT (OUTPATIENT)
Age: 83
End: 2023-06-21

## 2023-09-12 ENCOUNTER — RX ONLY (RX ONLY)
Age: 83
End: 2023-09-12

## 2023-09-12 ENCOUNTER — OFFICE (OUTPATIENT)
Facility: LOCATION | Age: 83
Setting detail: OPHTHALMOLOGY
End: 2023-09-12
Payer: MEDICARE

## 2023-09-12 DIAGNOSIS — H16.221: ICD-10-CM

## 2023-09-12 DIAGNOSIS — H11.042: ICD-10-CM

## 2023-09-12 DIAGNOSIS — H35.363: ICD-10-CM

## 2023-09-12 PROCEDURE — 92014 COMPRE OPH EXAM EST PT 1/>: CPT | Performed by: OPHTHALMOLOGY

## 2023-09-12 ASSESSMENT — KERATOMETRY
OD_AXISANGLE_DEGREES: 035
OS_K2POWER_DIOPTERS: 42.50
OD_K1POWER_DIOPTERS: 41.75
OS_AXISANGLE_DEGREES: 114
METHOD_AUTO_MANUAL: AUTO
OD_K2POWER_DIOPTERS: 42.50
OS_K1POWER_DIOPTERS: 41.00

## 2023-09-12 ASSESSMENT — REFRACTION_AUTOREFRACTION
OD_SPHERE: +0.75
OS_AXIS: 041
OD_AXIS: 105
OS_SPHERE: PLANO
OS_CYLINDER: -1.00
OD_CYLINDER: -2.00

## 2023-09-12 ASSESSMENT — REFRACTION_CURRENTRX
OS_VPRISM_DIRECTION: SV
OS_CYLINDER: SPH
OS_OVR_VA: 20/
OD_CYLINDER: SPH
OS_SPHERE: +1.50
OD_SPHERE: +1.50
OD_OVR_VA: 20/
OD_VPRISM_DIRECTION: SV

## 2023-09-12 ASSESSMENT — CONFRONTATIONAL VISUAL FIELD TEST (CVF)
OD_FINDINGS: FULL
OS_FINDINGS: FULL

## 2023-09-12 ASSESSMENT — DRY EYES - PHYSICIAN NOTES: OD_GENERALCOMMENTS: AXIAL

## 2023-09-12 ASSESSMENT — AXIALLENGTH_DERIVED: OD_AL: 24.2101

## 2023-09-12 ASSESSMENT — VISUAL ACUITY
OS_BCVA: 20/20-
OD_BCVA: 20/60

## 2023-09-12 ASSESSMENT — SPHEQUIV_DERIVED: OD_SPHEQUIV: -0.25

## 2023-09-12 ASSESSMENT — SUPERFICIAL PUNCTATE KERATITIS (SPK): OD_SPK: 1+ 2+

## 2023-09-12 ASSESSMENT — CORNEAL PTERYGIUM: OS_PTERYGIUM: NASAL

## 2023-09-15 ENCOUNTER — APPOINTMENT (OUTPATIENT)
Dept: INTERNAL MEDICINE | Facility: CLINIC | Age: 83
End: 2023-09-15

## 2023-09-28 ENCOUNTER — APPOINTMENT (OUTPATIENT)
Dept: ORTHOPEDIC SURGERY | Facility: CLINIC | Age: 83
End: 2023-09-28
Payer: MEDICARE

## 2023-09-28 DIAGNOSIS — M16.0 BILATERAL PRIMARY OSTEOARTHRITIS OF HIP: ICD-10-CM

## 2023-09-28 DIAGNOSIS — M76.892 OTHER SPECIFIED ENTHESOPATHIES OF LEFT LOWER LIMB, EXCLUDING FOOT: ICD-10-CM

## 2023-09-28 DIAGNOSIS — M47.816 SPONDYLOSIS W/OUT MYELOPATHY OR RADICULOPATHY, LUMBAR REGION: ICD-10-CM

## 2023-09-28 PROCEDURE — 72100 X-RAY EXAM L-S SPINE 2/3 VWS: CPT

## 2023-09-28 PROCEDURE — 73502 X-RAY EXAM HIP UNI 2-3 VIEWS: CPT

## 2023-09-28 PROCEDURE — 99204 OFFICE O/P NEW MOD 45 MIN: CPT

## 2023-09-28 PROCEDURE — 72170 X-RAY EXAM OF PELVIS: CPT | Mod: LT,59

## 2023-10-10 ENCOUNTER — LABORATORY RESULT (OUTPATIENT)
Age: 83
End: 2023-10-10

## 2023-10-10 ENCOUNTER — APPOINTMENT (OUTPATIENT)
Dept: INTERNAL MEDICINE | Facility: CLINIC | Age: 83
End: 2023-10-10
Payer: MEDICARE

## 2023-10-10 ENCOUNTER — NON-APPOINTMENT (OUTPATIENT)
Age: 83
End: 2023-10-10

## 2023-10-10 VITALS
HEIGHT: 63 IN | SYSTOLIC BLOOD PRESSURE: 130 MMHG | BODY MASS INDEX: 24.8 KG/M2 | DIASTOLIC BLOOD PRESSURE: 75 MMHG | RESPIRATION RATE: 14 BRPM | HEART RATE: 66 BPM | WEIGHT: 140 LBS

## 2023-10-10 DIAGNOSIS — E78.5 HYPERLIPIDEMIA, UNSPECIFIED: ICD-10-CM

## 2023-10-10 DIAGNOSIS — E03.9 HYPOTHYROIDISM, UNSPECIFIED: ICD-10-CM

## 2023-10-10 DIAGNOSIS — E55.9 VITAMIN D DEFICIENCY, UNSPECIFIED: ICD-10-CM

## 2023-10-10 DIAGNOSIS — Z78.9 OTHER SPECIFIED HEALTH STATUS: ICD-10-CM

## 2023-10-10 DIAGNOSIS — R91.1 SOLITARY PULMONARY NODULE: ICD-10-CM

## 2023-10-10 DIAGNOSIS — Z00.00 ENCOUNTER FOR GENERAL ADULT MEDICAL EXAMINATION W/OUT ABNORMAL FINDINGS: ICD-10-CM

## 2023-10-10 DIAGNOSIS — I71.20 THORACIC AORTIC ANEURYSM, WITHOUT RUPTURE, UNSPECIFIED: ICD-10-CM

## 2023-10-10 DIAGNOSIS — K21.9 GASTRO-ESOPHAGEAL REFLUX DISEASE W/OUT ESOPHAGITIS: ICD-10-CM

## 2023-10-10 PROCEDURE — 99497 ADVNCD CARE PLAN 30 MIN: CPT

## 2023-10-10 PROCEDURE — G0439: CPT

## 2023-10-10 PROCEDURE — 93000 ELECTROCARDIOGRAM COMPLETE: CPT

## 2023-10-10 PROCEDURE — 99214 OFFICE O/P EST MOD 30 MIN: CPT | Mod: 25

## 2023-10-10 RX ORDER — PANTOPRAZOLE 40 MG/1
40 TABLET, DELAYED RELEASE ORAL
Qty: 60 | Refills: 3 | Status: DISCONTINUED | COMMUNITY
Start: 2019-10-21 | End: 2023-10-10

## 2023-10-10 RX ORDER — DENOSUMAB 60 MG/ML
60 INJECTION SUBCUTANEOUS
Refills: 0 | Status: DISCONTINUED | COMMUNITY
Start: 2022-09-13 | End: 2023-10-10

## 2023-10-10 RX ORDER — DENOSUMAB 60 MG/ML
60 INJECTION SUBCUTANEOUS
Refills: 0 | Status: ACTIVE | COMMUNITY
Start: 2023-10-10

## 2023-10-11 ENCOUNTER — NON-APPOINTMENT (OUTPATIENT)
Age: 83
End: 2023-10-11

## 2023-10-11 LAB
25(OH)D3 SERPL-MCNC: 41.7 NG/ML
ALBUMIN SERPL ELPH-MCNC: 4.5 G/DL
ALP BLD-CCNC: 68 U/L
ALT SERPL-CCNC: 16 U/L
ANION GAP SERPL CALC-SCNC: 14 MMOL/L
APPEARANCE: CLEAR
AST SERPL-CCNC: 17 U/L
BASOPHILS # BLD AUTO: 0.05 K/UL
BASOPHILS NFR BLD AUTO: 0.7 %
BILIRUB SERPL-MCNC: 0.7 MG/DL
BILIRUBIN URINE: NEGATIVE
BLOOD URINE: NEGATIVE
BUN SERPL-MCNC: 23 MG/DL
CALCIUM SERPL-MCNC: 10.4 MG/DL
CHLORIDE SERPL-SCNC: 101 MMOL/L
CHOLEST SERPL-MCNC: 167 MG/DL
CO2 SERPL-SCNC: 25 MMOL/L
COLOR: YELLOW
CREAT SERPL-MCNC: 0.65 MG/DL
EGFR: 87 ML/MIN/1.73M2
EOSINOPHIL # BLD AUTO: 0.12 K/UL
EOSINOPHIL NFR BLD AUTO: 1.8 %
ESTIMATED AVERAGE GLUCOSE: 91 MG/DL
GLUCOSE QUALITATIVE U: NEGATIVE MG/DL
GLUCOSE SERPL-MCNC: 99 MG/DL
HBA1C MFR BLD HPLC: 4.8 %
HCT VFR BLD CALC: 42 %
HDLC SERPL-MCNC: 64 MG/DL
HGB BLD-MCNC: 14.5 G/DL
IMM GRANULOCYTES NFR BLD AUTO: 0.4 %
KETONES URINE: NEGATIVE MG/DL
LDLC SERPL CALC-MCNC: 87 MG/DL
LEUKOCYTE ESTERASE URINE: ABNORMAL
LYMPHOCYTES # BLD AUTO: 1.92 K/UL
LYMPHOCYTES NFR BLD AUTO: 28.2 %
MAN DIFF?: NORMAL
MCHC RBC-ENTMCNC: 33.4 PG
MCHC RBC-ENTMCNC: 34.5 GM/DL
MCV RBC AUTO: 96.8 FL
MONOCYTES # BLD AUTO: 0.4 K/UL
MONOCYTES NFR BLD AUTO: 5.9 %
NEUTROPHILS # BLD AUTO: 4.3 K/UL
NEUTROPHILS NFR BLD AUTO: 63 %
NITRITE URINE: NEGATIVE
NONHDLC SERPL-MCNC: 103 MG/DL
PH URINE: 6
PLATELET # BLD AUTO: 217 K/UL
POTASSIUM SERPL-SCNC: 4.8 MMOL/L
PROT SERPL-MCNC: 6.5 G/DL
PROTEIN URINE: NEGATIVE MG/DL
RBC # BLD: 4.34 M/UL
RBC # FLD: 13 %
SODIUM SERPL-SCNC: 140 MMOL/L
SPECIFIC GRAVITY URINE: 1.02
T4 SERPL-MCNC: 8.7 UG/DL
TRIGL SERPL-MCNC: 82 MG/DL
TSH SERPL-ACNC: 2.14 UIU/ML
UROBILINOGEN URINE: 0.2 MG/DL
WBC # FLD AUTO: 6.82 K/UL

## 2023-10-18 ENCOUNTER — APPOINTMENT (OUTPATIENT)
Dept: BARIATRICS/WEIGHT MGMT | Facility: CLINIC | Age: 83
End: 2023-10-18

## 2023-10-20 ENCOUNTER — NON-APPOINTMENT (OUTPATIENT)
Age: 83
End: 2023-10-20

## 2023-10-20 DIAGNOSIS — K63.5 POLYP OF COLON: ICD-10-CM

## 2023-10-23 ENCOUNTER — OUTPATIENT (OUTPATIENT)
Dept: OUTPATIENT SERVICES | Facility: HOSPITAL | Age: 83
LOS: 1 days | End: 2023-10-23
Payer: MEDICARE

## 2023-10-23 ENCOUNTER — APPOINTMENT (OUTPATIENT)
Dept: CT IMAGING | Facility: IMAGING CENTER | Age: 83
End: 2023-10-23
Payer: MEDICARE

## 2023-10-23 DIAGNOSIS — Z98.890 OTHER SPECIFIED POSTPROCEDURAL STATES: Chronic | ICD-10-CM

## 2023-10-23 DIAGNOSIS — E78.5 HYPERLIPIDEMIA, UNSPECIFIED: ICD-10-CM

## 2023-10-23 DIAGNOSIS — I71.20 THORACIC AORTIC ANEURYSM, WITHOUT RUPTURE, UNSPECIFIED: ICD-10-CM

## 2023-10-23 PROCEDURE — 71260 CT THORAX DX C+: CPT | Mod: 26,MH

## 2023-10-23 PROCEDURE — 71260 CT THORAX DX C+: CPT

## 2023-10-26 ENCOUNTER — APPOINTMENT (OUTPATIENT)
Dept: ORTHOPEDIC SURGERY | Facility: CLINIC | Age: 83
End: 2023-10-26
Payer: MEDICARE

## 2023-10-26 PROCEDURE — 99214 OFFICE O/P EST MOD 30 MIN: CPT

## 2023-11-03 DIAGNOSIS — K86.2 CYST OF PANCREAS: ICD-10-CM

## 2023-11-06 ENCOUNTER — APPOINTMENT (OUTPATIENT)
Dept: MRI IMAGING | Facility: CLINIC | Age: 83
End: 2023-11-06
Payer: MEDICARE

## 2023-11-06 ENCOUNTER — APPOINTMENT (OUTPATIENT)
Dept: RADIOLOGY | Facility: CLINIC | Age: 83
End: 2023-11-06
Payer: MEDICARE

## 2023-11-06 ENCOUNTER — OUTPATIENT (OUTPATIENT)
Dept: OUTPATIENT SERVICES | Facility: HOSPITAL | Age: 83
LOS: 1 days | End: 2023-11-06
Payer: SELF-PAY

## 2023-11-06 ENCOUNTER — OUTPATIENT (OUTPATIENT)
Dept: OUTPATIENT SERVICES | Facility: HOSPITAL | Age: 83
LOS: 1 days | End: 2023-11-06
Payer: MEDICARE

## 2023-11-06 ENCOUNTER — APPOINTMENT (OUTPATIENT)
Dept: RADIOLOGY | Facility: CLINIC | Age: 83
End: 2023-11-06
Payer: SELF-PAY

## 2023-11-06 DIAGNOSIS — K86.2 CYST OF PANCREAS: ICD-10-CM

## 2023-11-06 DIAGNOSIS — Z98.890 OTHER SPECIFIED POSTPROCEDURAL STATES: Chronic | ICD-10-CM

## 2023-11-06 PROCEDURE — 74018 RADEX ABDOMEN 1 VIEW: CPT

## 2023-11-06 PROCEDURE — 74183 MRI ABD W/O CNTR FLWD CNTR: CPT | Mod: 26,MH

## 2023-11-06 PROCEDURE — A9585: CPT

## 2023-11-06 PROCEDURE — 74183 MRI ABD W/O CNTR FLWD CNTR: CPT

## 2023-11-06 PROCEDURE — 74018 RADEX ABDOMEN 1 VIEW: CPT | Mod: 26

## 2023-11-14 ENCOUNTER — NON-APPOINTMENT (OUTPATIENT)
Age: 83
End: 2023-11-14

## 2023-11-21 RX ORDER — PANTOPRAZOLE 40 MG/1
40 TABLET, DELAYED RELEASE ORAL DAILY
Qty: 90 | Refills: 3 | Status: ACTIVE | COMMUNITY
Start: 2022-09-07 | End: 1900-01-01

## 2023-12-11 ENCOUNTER — RX RENEWAL (OUTPATIENT)
Age: 83
End: 2023-12-11

## 2024-03-05 ENCOUNTER — NON-APPOINTMENT (OUTPATIENT)
Age: 84
End: 2024-03-05

## 2024-03-07 ENCOUNTER — NON-APPOINTMENT (OUTPATIENT)
Age: 84
End: 2024-03-07

## 2024-03-15 ENCOUNTER — RX RENEWAL (OUTPATIENT)
Age: 84
End: 2024-03-15

## 2024-03-15 RX ORDER — ATORVASTATIN CALCIUM 10 MG/1
10 TABLET, FILM COATED ORAL
Qty: 90 | Refills: 2 | Status: ACTIVE | COMMUNITY
Start: 2020-08-11 | End: 1900-01-01

## 2024-05-21 ENCOUNTER — NON-APPOINTMENT (OUTPATIENT)
Age: 84
End: 2024-05-21

## 2024-05-21 ENCOUNTER — APPOINTMENT (OUTPATIENT)
Dept: CARDIOLOGY | Facility: CLINIC | Age: 84
End: 2024-05-21
Payer: MEDICARE

## 2024-05-21 VITALS
HEART RATE: 67 BPM | SYSTOLIC BLOOD PRESSURE: 138 MMHG | DIASTOLIC BLOOD PRESSURE: 72 MMHG | BODY MASS INDEX: 22.5 KG/M2 | OXYGEN SATURATION: 98 % | WEIGHT: 127 LBS

## 2024-05-21 DIAGNOSIS — R07.89 OTHER CHEST PAIN: ICD-10-CM

## 2024-05-21 DIAGNOSIS — Z01.810 ENCOUNTER FOR PREPROCEDURAL CARDIOVASCULAR EXAMINATION: ICD-10-CM

## 2024-05-21 DIAGNOSIS — E78.00 PURE HYPERCHOLESTEROLEMIA, UNSPECIFIED: ICD-10-CM

## 2024-05-21 DIAGNOSIS — I10 ESSENTIAL (PRIMARY) HYPERTENSION: ICD-10-CM

## 2024-05-21 PROCEDURE — 93000 ELECTROCARDIOGRAM COMPLETE: CPT

## 2024-05-21 PROCEDURE — 99214 OFFICE O/P EST MOD 30 MIN: CPT

## 2024-05-21 NOTE — HISTORY OF PRESENT ILLNESS
[FreeTextEntry1] : 83-year-old female with a history of hypertension, hypercholesterolemia, GERD with a Mendoza's esophagus, chest pain, hypothyroidism.  She was last seen about a year ago.  She is feeling generally well.  She remains physically active with no exertional complaints.  She has been taking Ozempic and reports she is lost about 20 pounds.  She would be having a repeat endoscopy to follow her Mendoza's esophagus and requires clearance.  His last LDL cholesterol was 108 on Lipitor 10.

## 2024-05-21 NOTE — DISCUSSION/SUMMARY
[FreeTextEntry1] : Ms Oreilly has no new complaints and looks unchanged.  Her exam shows a weight loss of 13 pounds, normal blood pressure, clear lungs, and a normal cardiac exam.  Her EKG shows bifascicular block and is unchanged.  She is stable and doing well.  She is cleared to proceed with endoscopy.  She will continue on Lipitor 10, amlodipine 5, and Synthroid.  She will follow-up in a year.  She has not had an echo in 5 years and will be repeated at that time. [EKG obtained to assist in diagnosis and management of assessed problem(s)] : EKG obtained to assist in diagnosis and management of assessed problem(s)

## 2024-05-28 ENCOUNTER — APPOINTMENT (OUTPATIENT)
Dept: GASTROENTEROLOGY | Facility: HOSPITAL | Age: 84
End: 2024-05-28

## 2024-05-28 ENCOUNTER — TRANSCRIPTION ENCOUNTER (OUTPATIENT)
Age: 84
End: 2024-05-28

## 2024-05-28 ENCOUNTER — OUTPATIENT (OUTPATIENT)
Dept: OUTPATIENT SERVICES | Facility: HOSPITAL | Age: 84
LOS: 1 days | Discharge: HOME | End: 2024-05-28
Payer: MEDICARE

## 2024-05-28 ENCOUNTER — RESULT REVIEW (OUTPATIENT)
Age: 84
End: 2024-05-28

## 2024-05-28 VITALS
HEART RATE: 60 BPM | SYSTOLIC BLOOD PRESSURE: 130 MMHG | DIASTOLIC BLOOD PRESSURE: 75 MMHG | WEIGHT: 126.99 LBS | OXYGEN SATURATION: 96 % | TEMPERATURE: 97 F | HEIGHT: 63.5 IN | RESPIRATION RATE: 13 BRPM

## 2024-05-28 VITALS
DIASTOLIC BLOOD PRESSURE: 63 MMHG | HEART RATE: 61 BPM | OXYGEN SATURATION: 96 % | RESPIRATION RATE: 13 BRPM | SYSTOLIC BLOOD PRESSURE: 137 MMHG

## 2024-05-28 DIAGNOSIS — Z98.890 OTHER SPECIFIED POSTPROCEDURAL STATES: Chronic | ICD-10-CM

## 2024-05-28 DIAGNOSIS — K22.70 BARRETT'S ESOPHAGUS WITHOUT DYSPLASIA: ICD-10-CM

## 2024-05-28 PROCEDURE — 88305 TISSUE EXAM BY PATHOLOGIST: CPT | Mod: 26

## 2024-05-28 PROCEDURE — 43239 EGD BIOPSY SINGLE/MULTIPLE: CPT

## 2024-05-28 RX ORDER — AMLODIPINE BESYLATE AND BENAZEPRIL HYDROCHLORIDE 10; 20 MG/1; MG/1
1 CAPSULE ORAL
Qty: 0 | Refills: 0 | DISCHARGE

## 2024-05-28 RX ORDER — BUPROPION HYDROCHLORIDE 150 MG/1
1 TABLET, EXTENDED RELEASE ORAL
Qty: 0 | Refills: 0 | DISCHARGE

## 2024-05-28 RX ORDER — PANTOPRAZOLE SODIUM 20 MG/1
1 TABLET, DELAYED RELEASE ORAL
Qty: 0 | Refills: 0 | DISCHARGE

## 2024-05-28 RX ORDER — ATORVASTATIN CALCIUM 80 MG/1
1 TABLET, FILM COATED ORAL
Qty: 0 | Refills: 0 | DISCHARGE

## 2024-05-28 RX ORDER — ALPRAZOLAM 0.25 MG
1 TABLET ORAL
Qty: 0 | Refills: 0 | DISCHARGE

## 2024-05-28 RX ORDER — ESCITALOPRAM OXALATE 10 MG/1
1 TABLET, FILM COATED ORAL
Qty: 0 | Refills: 0 | DISCHARGE

## 2024-05-28 RX ORDER — SODIUM CHLORIDE 9 MG/ML
500 INJECTION, SOLUTION INTRAVENOUS
Refills: 0 | Status: DISCONTINUED | OUTPATIENT
Start: 2024-05-28 | End: 2024-06-11

## 2024-05-28 NOTE — ASU PATIENT PROFILE, ADULT - NS PRO MODE OF ARRIVAL
Patient wants a refill on Clonazepam 0 5 mg tablets take 1 tablet by mouth daily at bedtime total 30 tablets to [facility] pharmacy [address] [place] [address] Street  Patient can be reached at [phone] any questions  ambulatory

## 2024-05-28 NOTE — ASU PREOP CHECKLIST - BLOOD AVAILABLE
[FreeTextEntry1] : Impression: Fracture right tibia.  The cast has been removed there is no local tenderness I discussed care of the extremity and activities with mom return as needed
n/a

## 2024-05-28 NOTE — ASU DISCHARGE PLAN (ADULT/PEDIATRIC) - NS MD DC FALL RISK RISK
For information on Fall & Injury Prevention, visit: [email or web address] OR  [email or web address] OR  https://www.cdc.gov/steadi/patient.html

## 2024-05-28 NOTE — ASU PATIENT PROFILE, ADULT - MEDICATION HERBAL REMEDIES, PROFILE
vilazodone HCl (VIIBRYD) 20 MG TABS, Take 1 tablet by mouth daily, Disp: , Rfl:     eszopiclone 3 MG TABS, Take 1 tablet by mouth nightly as needed (insomnia) for up to 360 days. Max Daily Amount: 3 mg, Disp: 30 tablet, Rfl: 1    emtricitabine-tenofovir alafenamide (DESCOVY) 200-25 MG TABS tablet, Take 1 tablet by mouth daily (Patient not taking: Reported on 6/5/2023), Disp: , Rfl:     ALLERGIES:   Allergies   Allergen Reactions    Bupropion      Unknown Reaction. Lunesta [Eszopiclone] Itching    Adhesive Tape Dermatitis and Rash       FAMILY HISTORY:       Problem Relation Age of Onset    Cancer Mother 48        Cervical    Alcohol Abuse Mother     Substance Abuse Mother     Alcohol Abuse Father     Substance Abuse Father          SOCIAL HISTORY:   Social History     Socioeconomic History    Marital status: Single     Spouse name: Not on file    Number of children: Not on file    Years of education: Not on file    Highest education level: Not on file   Occupational History    Not on file   Tobacco Use    Smoking status: Never    Smokeless tobacco: Never   Vaping Use    Vaping Use: Never used   Substance and Sexual Activity    Alcohol use: Yes     Comment: Social use    Drug use: Yes     Types: Marijuana (Weed)     Comment: Social, maybe once a month    Sexual activity: Not Currently     Partners: Male, Female   Other Topics Concern    Not on file   Social History Narrative    Not on file     Social Determinants of Health     Financial Resource Strain: Low Risk     Difficulty of Paying Living Expenses: Not hard at all   Food Insecurity: No Food Insecurity    Worried About Running Out of Food in the Last Year: Never true    Ran Out of Food in the Last Year: Never true   Transportation Needs: Unknown    Lack of Transportation (Medical): Not on file    Lack of Transportation (Non-Medical):  No   Physical Activity: Not on file   Stress: Not on file   Social Connections: Not on file   Intimate Partner Violence: Not on no

## 2024-05-28 NOTE — PRE PROCEDURE NOTE - PRE PROCEDURE EVALUATION
Pre-Endoscopy Evaluation    Referring Physician:                                    Indication for Procedure:  Barretts    Sedation by Anesthesia [X ]    PAST MEDICAL & SURGICAL HISTORY:  Murmur, cardiac      History of Mendoza's esophagus  dx: ' 2012      Osteoarthritis      Anxiety and depression  medically managed. Never Hospitalized.      GERD (gastroesophageal reflux disease)      S/P appendectomy  age 8      Fracture  ' 80's    repair of right knee with pinning      H/O shoulder replacement  right 6/2012        Left ' 2017      H/O abdominoplasty  ' 80's      H/O bilateral breast reduction surgery  ' 80's      S/P cataract extraction  '10   Bilateral      S/P tonsillectomy  age 1      S/P cosmetic plastic surgery  Face          Latex allergy: [ ] yes [X] no    Smoking: [ ] yes  [X] no    AICD/PPM: [ ] yes   [X] no    Pertinent lab data:                      Physical Examination:  Daily Height in cm: 161.29 (28 May 2024 08:42)    Daily   Vital Signs Last 24 Hrs  T(C): 36.1 (28 May 2024 09:52), Max: 36.2 (28 May 2024 08:42)  T(F): 97 (28 May 2024 09:52), Max: 97.2 (28 May 2024 08:42)  HR: 61 (28 May 2024 10:22) (56 - 61)  BP: 137/63 (28 May 2024 10:22) (130/75 - 141/73)  BP(mean): 71 (28 May 2024 10:22) (71 - 88)  RR: 13 (28 May 2024 10:22) (10 - 13)  SpO2: 96% (28 May 2024 10:22) (96% - 97%)    Parameters below as of 28 May 2024 09:52  Patient On (Oxygen Delivery Method): room air        Constitutional: NAD    HEENT: PERRLA, EOMI,       Neck:  No JVD    Respiratory: CTAB/L    Cardiovascular: S1 and S2    Gastrointestinal: BS+, soft, NT/ND    Extremities: No peripheral edema    Neurological: A/O x 3, no focal deficits    Psychiatric: Normal mood, normal affect    : No Nichols    Skin: No rashes    Comments:    ASA Class: I [ ]  II [X ]  III [ ]  IV [ ]    The patient is a suitable candidate for the planned procedure unless box checked [ ]  No, explain:

## 2024-05-28 NOTE — ASU PATIENT PROFILE, ADULT - CAREGIVER NAME
Spoke with patient's  and he already picked up diflucan from pharmacy  He states patient is weak  Will place urine culture order in chart for patient to have urine tested tomorrow  He believes patient may be having UTI in addition to yeast in urine  Office to monitor for results  Errol
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
## 2024-06-03 LAB — SURGICAL PATHOLOGY STUDY: SIGNIFICANT CHANGE UP

## 2024-06-05 ENCOUNTER — APPOINTMENT (OUTPATIENT)
Dept: SURGERY | Facility: CLINIC | Age: 84
End: 2024-06-05
Payer: MEDICARE

## 2024-06-05 VITALS
SYSTOLIC BLOOD PRESSURE: 128 MMHG | DIASTOLIC BLOOD PRESSURE: 75 MMHG | HEART RATE: 53 BPM | HEIGHT: 63 IN | WEIGHT: 127 LBS | RESPIRATION RATE: 16 BRPM | BODY MASS INDEX: 22.5 KG/M2 | TEMPERATURE: 97.2 F | OXYGEN SATURATION: 99 %

## 2024-06-05 DIAGNOSIS — M62.08 SEPARATION OF MUSCLE (NONTRAUMATIC), OTHER SITE: ICD-10-CM

## 2024-06-05 DIAGNOSIS — R10.13 EPIGASTRIC PAIN: ICD-10-CM

## 2024-06-05 PROCEDURE — 99203 OFFICE O/P NEW LOW 30 MIN: CPT

## 2024-06-05 NOTE — PHYSICAL EXAM
[Normal Breath Sounds] : Normal breath sounds [Normal Heart Sounds] : normal heart sounds [No Rash or Lesion] : No rash or lesion [Calm] : calm [de-identified] : No distress [de-identified] : Sclera clear [de-identified] : Supple [de-identified] : Soft and nontender.  Diastases recti is present.  There is a well-healed abdominoplasty scar in the lower abdomen and another large scar just above the umbilicus.  No definitive fascial defects are palpated. [de-identified] : No clubbing cyanosis or edema [de-identified] : Cranial nerves II through XII grossly intact

## 2024-06-05 NOTE — CONSULT LETTER
[Dear  ___] : Dear  [unfilled], [Consult Letter:] : I had the pleasure of evaluating your patient, [unfilled]. [Please see my note below.] : Please see my note below. [Consult Closing:] : Thank you very much for allowing me to participate in the care of this patient.  If you have any questions, please do not hesitate to contact me. [Sincerely,] : Sincerely, [FreeTextEntry3] : Houston Snyder MD, FACS [facility] [address]  [place]  [address] Tel: [phone] Email: [email or web address]

## 2024-06-05 NOTE — HISTORY OF PRESENT ILLNESS
[de-identified] : Yamilet is a 84 y/o female here for a possible bulge. [de-identified] : This 83-year-old woman lost a great deal of weight over the last year or so taking Ozempic.  In the last several months she noted a bulge in the mid abdomen.  This bulge causes no pain.  She is otherwise tolerating regular diet, having normal bowel movements and voiding without issues.  She underwent abdominoplasty and another procedure,  for "more fat on her abdominal wall".

## 2024-06-05 NOTE — ASSESSMENT
[FreeTextEntry1] : This patient is suffering from, at least diastases recti.  It is unclear to me whether she has an actual hernia.  I have asked her to obtain a CAT scan of the abdomen and pelvis for a better look at her abdominal wall.    I will keep you up-to-date on further developments

## 2024-06-06 ENCOUNTER — RESULT REVIEW (OUTPATIENT)
Age: 84
End: 2024-06-06

## 2024-06-12 ENCOUNTER — OUTPATIENT (OUTPATIENT)
Dept: OUTPATIENT SERVICES | Facility: HOSPITAL | Age: 84
LOS: 1 days | End: 2024-06-12
Payer: MEDICARE

## 2024-06-12 ENCOUNTER — APPOINTMENT (OUTPATIENT)
Dept: CT IMAGING | Facility: IMAGING CENTER | Age: 84
End: 2024-06-12
Payer: MEDICARE

## 2024-06-12 DIAGNOSIS — Z98.890 OTHER SPECIFIED POSTPROCEDURAL STATES: Chronic | ICD-10-CM

## 2024-06-12 DIAGNOSIS — M62.08 SEPARATION OF MUSCLE (NONTRAUMATIC), OTHER SITE: ICD-10-CM

## 2024-06-12 PROCEDURE — 74176 CT ABD & PELVIS W/O CONTRAST: CPT | Mod: 26,MH

## 2024-06-12 PROCEDURE — 74176 CT ABD & PELVIS W/O CONTRAST: CPT

## 2024-06-19 ENCOUNTER — NON-APPOINTMENT (OUTPATIENT)
Age: 84
End: 2024-06-19

## 2024-06-26 ENCOUNTER — RX RENEWAL (OUTPATIENT)
Age: 84
End: 2024-06-26

## 2024-06-26 ENCOUNTER — APPOINTMENT (OUTPATIENT)
Dept: SURGERY | Facility: CLINIC | Age: 84
End: 2024-06-26

## 2024-06-26 VITALS
HEART RATE: 70 BPM | WEIGHT: 127 LBS | SYSTOLIC BLOOD PRESSURE: 121 MMHG | BODY MASS INDEX: 22.5 KG/M2 | TEMPERATURE: 98 F | HEIGHT: 63 IN | DIASTOLIC BLOOD PRESSURE: 80 MMHG | RESPIRATION RATE: 17 BRPM | OXYGEN SATURATION: 99 %

## 2024-06-26 DIAGNOSIS — K40.90 UNILATERAL INGUINAL HERNIA, W/OUT OBSTRUCTION OR GANGRENE, NOT SPECIFIED AS RECURRENT: ICD-10-CM

## 2024-06-26 PROCEDURE — 99213 OFFICE O/P EST LOW 20 MIN: CPT

## 2024-06-26 RX ORDER — AMLODIPINE BESYLATE 5 MG/1
5 TABLET ORAL
Qty: 90 | Refills: 0 | Status: ACTIVE | COMMUNITY
Start: 2019-10-21 | End: 1900-01-01

## 2024-06-26 NOTE — HISTORY OF PRESENT ILLNESS
[de-identified] : Yamilet is a 84 y/o female here for a follow up after ct scan.  CT done 06/12/2024- IMPRESSION: Right inguinal hernia containing loop of small bowel. Small fat-containing ventral supraumbilical hernia. Multiple bilateral renal calculi measuring up to 1.3 cm without associated hydronephrosis. [de-identified] : This 83-year-old patient is known to me when I saw her earlier in June when she complains of an unsightly bulge well above the umbilicus that she noted after losing 35 pounds.  She denies any pain.  She denies pain in the right groin or at the umbilicus.  She denies any change in bowel or urinary habits.  I sent her for a CAT scan of the abdomen pelvis to rule out occult hernias.

## 2024-06-26 NOTE — ASSESSMENT
[FreeTextEntry1] : This patient is suffering from a right inguinal hernia.  Offered her repair of this fascial defect.  The procedure as well as risks(including, but not limited to bleeding, infection, injury to hollow viscus or other organs), benefits (pain relief), and alternatives were explained to the patient.  Please advise me on the safety of general anesthesia for this pleasant patient

## 2024-06-26 NOTE — DATA REVIEWED
[FreeTextEntry1] : I reviewed the CAT scan images from June 12.  They revealed a tiny fat-containing supraumbilical hernia and a right inguinal hernia containing fat and small bowel.

## 2024-06-26 NOTE — CONSULT LETTER
[Dear  ___] : Dear  [unfilled], [Consult Letter:] : I had the pleasure of evaluating your patient, [unfilled]. [Please see my note below.] : Please see my note below. [Consult Closing:] : Thank you very much for allowing me to participate in the care of this patient.  If you have any questions, please do not hesitate to contact me. [Sincerely,] : Sincerely, [FreeTextEntry3] : Houston Snyder MD, FACS [facility] [address]  [place]  [address] Tel: [phone] Email: [email or web address]

## 2024-06-26 NOTE — PHYSICAL EXAM
[Normal Breath Sounds] : Normal breath sounds [Normal Heart Sounds] : normal heart sounds [No Rash or Lesion] : No rash or lesion [Calm] : calm [de-identified] : No distress [de-identified] : Sclera clear [de-identified] : Supple [de-identified] : Soft and nontender.  There are no fascial defects at or above the umbilicus.  There is a suggestion of a direct defect in the right groin.  Are well-healed abdominoplasty scars. [de-identified] : No clubbing cyanosis or edema [de-identified] : Cranial nerves II through XII grossly intact

## 2024-07-19 ENCOUNTER — APPOINTMENT (OUTPATIENT)
Dept: INTERNAL MEDICINE | Facility: CLINIC | Age: 84
End: 2024-07-19
Payer: MEDICARE

## 2024-07-19 VITALS
BODY MASS INDEX: 22.5 KG/M2 | HEART RATE: 70 BPM | WEIGHT: 127 LBS | SYSTOLIC BLOOD PRESSURE: 120 MMHG | RESPIRATION RATE: 14 BRPM | HEIGHT: 63 IN | DIASTOLIC BLOOD PRESSURE: 75 MMHG

## 2024-07-19 DIAGNOSIS — E78.5 HYPERLIPIDEMIA, UNSPECIFIED: ICD-10-CM

## 2024-07-19 DIAGNOSIS — Z01.818 ENCOUNTER FOR OTHER PREPROCEDURAL EXAMINATION: ICD-10-CM

## 2024-07-19 DIAGNOSIS — I10 ESSENTIAL (PRIMARY) HYPERTENSION: ICD-10-CM

## 2024-07-19 DIAGNOSIS — K40.90 UNILATERAL INGUINAL HERNIA, W/OUT OBSTRUCTION OR GANGRENE, NOT SPECIFIED AS RECURRENT: ICD-10-CM

## 2024-07-19 PROCEDURE — 99214 OFFICE O/P EST MOD 30 MIN: CPT

## 2024-07-19 PROCEDURE — G2211 COMPLEX E/M VISIT ADD ON: CPT

## 2024-07-19 RX ORDER — SEMAGLUTIDE 0.68 MG/ML
INJECTION, SOLUTION SUBCUTANEOUS
Refills: 0 | Status: ACTIVE | COMMUNITY

## 2024-07-22 RX ORDER — CHLORHEXIDINE GLUCONATE 500 MG/1
1 CLOTH TOPICAL ONCE
Refills: 0 | Status: DISCONTINUED | OUTPATIENT
Start: 2024-08-05 | End: 2024-08-19

## 2024-07-22 RX ORDER — BACTERIOSTATIC SODIUM CHLORIDE 0.9 %
3 VIAL (ML) INJECTION EVERY 8 HOURS
Refills: 0 | Status: DISCONTINUED | OUTPATIENT
Start: 2024-08-05 | End: 2024-08-19

## 2024-07-22 RX ORDER — CEFOTETAN 2 G/1
2 INJECTION, POWDER, FOR SOLUTION INTRAMUSCULAR; INTRAVENOUS ONCE
Refills: 0 | Status: DISCONTINUED | OUTPATIENT
Start: 2024-08-05 | End: 2024-08-19

## 2024-07-22 RX ORDER — DEXTROSE MONOHYDRATE, SODIUM CHLORIDE, SODIUM LACTATE, CALCIUM CHLORIDE, MAGNESIUM CHLORIDE 1.5; 538; 448; 18.4; 5.08 G/100ML; MG/100ML; MG/100ML; MG/100ML; MG/100ML
1000 SOLUTION INTRAPERITONEAL
Refills: 0 | Status: DISCONTINUED | OUTPATIENT
Start: 2024-08-05 | End: 2024-08-19

## 2024-07-26 RX ORDER — MUPIROCIN 20 MG/G
2 OINTMENT TOPICAL
Qty: 1 | Refills: 0 | Status: ACTIVE | COMMUNITY
Start: 2024-07-26 | End: 1900-01-01

## 2024-08-04 ENCOUNTER — TRANSCRIPTION ENCOUNTER (OUTPATIENT)
Age: 84
End: 2024-08-04

## 2024-08-05 ENCOUNTER — OUTPATIENT (OUTPATIENT)
Dept: OUTPATIENT SERVICES | Facility: HOSPITAL | Age: 84
LOS: 1 days | End: 2024-08-05
Payer: MEDICARE

## 2024-08-05 ENCOUNTER — APPOINTMENT (OUTPATIENT)
Dept: SURGERY | Facility: HOSPITAL | Age: 84
End: 2024-08-05

## 2024-08-05 ENCOUNTER — RESULT REVIEW (OUTPATIENT)
Age: 84
End: 2024-08-05

## 2024-08-05 ENCOUNTER — TRANSCRIPTION ENCOUNTER (OUTPATIENT)
Age: 84
End: 2024-08-05

## 2024-08-05 VITALS
SYSTOLIC BLOOD PRESSURE: 134 MMHG | TEMPERATURE: 99 F | HEIGHT: 63 IN | HEART RATE: 69 BPM | DIASTOLIC BLOOD PRESSURE: 90 MMHG | OXYGEN SATURATION: 96 % | RESPIRATION RATE: 18 BRPM | WEIGHT: 128.09 LBS

## 2024-08-05 VITALS
HEART RATE: 68 BPM | OXYGEN SATURATION: 98 % | SYSTOLIC BLOOD PRESSURE: 129 MMHG | RESPIRATION RATE: 18 BRPM | DIASTOLIC BLOOD PRESSURE: 69 MMHG

## 2024-08-05 DIAGNOSIS — Z96.642 PRESENCE OF LEFT ARTIFICIAL HIP JOINT: Chronic | ICD-10-CM

## 2024-08-05 DIAGNOSIS — Z98.890 OTHER SPECIFIED POSTPROCEDURAL STATES: Chronic | ICD-10-CM

## 2024-08-05 DIAGNOSIS — Z98.1 ARTHRODESIS STATUS: Chronic | ICD-10-CM

## 2024-08-05 DIAGNOSIS — K40.90 UNILATERAL INGUINAL HERNIA, WITHOUT OBSTRUCTION OR GANGRENE, NOT SPECIFIED AS RECURRENT: ICD-10-CM

## 2024-08-05 DIAGNOSIS — Z96.651 PRESENCE OF RIGHT ARTIFICIAL KNEE JOINT: Chronic | ICD-10-CM

## 2024-08-05 PROCEDURE — 49505 PRP I/HERN INIT REDUC >5 YR: CPT | Mod: RT

## 2024-08-05 PROCEDURE — 88304 TISSUE EXAM BY PATHOLOGIST: CPT

## 2024-08-05 PROCEDURE — 88304 TISSUE EXAM BY PATHOLOGIST: CPT | Mod: 26

## 2024-08-05 PROCEDURE — C1781: CPT

## 2024-08-05 DEVICE — MESH HERNIA MARLEX 3 X 6": Type: IMPLANTABLE DEVICE | Site: RIGHT | Status: FUNCTIONAL

## 2024-08-05 DEVICE — SURGICEL POWDER 3 GRAMS: Type: IMPLANTABLE DEVICE | Site: RIGHT | Status: FUNCTIONAL

## 2024-08-05 DEVICE — MESH HERNIA SQUARE 6 X 6": Type: IMPLANTABLE DEVICE | Site: RIGHT | Status: FUNCTIONAL

## 2024-08-05 RX ORDER — KETOROLAC TROMETHAMINE 10 MG
15 TABLET ORAL ONCE
Refills: 0 | Status: DISCONTINUED | OUTPATIENT
Start: 2024-08-05 | End: 2024-08-05

## 2024-08-05 RX ORDER — OXYCODONE HYDROCHLORIDE 30 MG/1
6 TABLET ORAL
Qty: 6 | Refills: 0
Start: 2024-08-05

## 2024-08-05 RX ORDER — ONDANSETRON HCL/PF 4 MG/2 ML
4 VIAL (ML) INJECTION ONCE
Refills: 0 | Status: COMPLETED | OUTPATIENT
Start: 2024-08-05 | End: 2024-08-05

## 2024-08-05 RX ORDER — OXYCODONE HYDROCHLORIDE 30 MG/1
5 TABLET ORAL ONCE
Refills: 0 | Status: DISCONTINUED | OUTPATIENT
Start: 2024-08-05 | End: 2024-08-05

## 2024-08-05 RX ORDER — OXYCODONE HYDROCHLORIDE 30 MG/1
1 TABLET ORAL
Qty: 6 | Refills: 0
Start: 2024-08-05

## 2024-08-05 RX ADMIN — Medication 15 MILLIGRAM(S): at 17:02

## 2024-08-05 RX ADMIN — OXYCODONE HYDROCHLORIDE 5 MILLIGRAM(S): 30 TABLET ORAL at 15:29

## 2024-08-05 RX ADMIN — OXYCODONE HYDROCHLORIDE 5 MILLIGRAM(S): 30 TABLET ORAL at 16:30

## 2024-08-05 RX ADMIN — Medication 4 MILLIGRAM(S): at 15:29

## 2024-08-05 NOTE — ASU PATIENT PROFILE, ADULT - NSICDXPASTSURGICALHX_GEN_ALL_CORE_FT
PAST SURGICAL HISTORY:  Fracture ' 80's    repair of right knee with pinning    H/O abdominoplasty ' 80's    H/O bilateral breast reduction surgery ' 80's    H/O shoulder replacement right 6/2012        Left ' 2017    History of left hip replacement     S/P appendectomy age 8    S/P cataract extraction '10   Bilateral    S/P cosmetic plastic surgery Face    S/P lumbar spinal fusion     S/P tonsillectomy age 1    S/P total knee replacement, right

## 2024-08-05 NOTE — ASU PATIENT PROFILE, ADULT - NSICDXPASTMEDICALHX_GEN_ALL_CORE_FT
PAST MEDICAL HISTORY:  Anxiety and depression medically managed. Never Hospitalized.    GERD (gastroesophageal reflux disease)     History of Mendoza's esophagus dx: ' 2012    Osteoarthritis

## 2024-08-05 NOTE — ASU DISCHARGE PLAN (ADULT/PEDIATRIC) - CARE PROVIDER_API CALL
Houston Hahn.  Surgery  [address], [address]  [place] [address]  Phone: [phone]  Fax: [phone]  Follow Up Time: 2 weeks

## 2024-08-05 NOTE — ASU DISCHARGE PLAN (ADULT/PEDIATRIC) - NURSING INSTRUCTIONS
Tylenol/acetaminophen  as needed for pain/discomfort.   NEXT DOSE:   Tylenol  OK @  7:00 pm this evening, and every 6 hours thereafter, if needed.  Oxycodone as ordered for severe pain.   Please read and follow preprinted, MD-specific instruction sheet provided.  Follow up with MD as requested; call office for appointment.

## 2024-08-05 NOTE — PRE-ANESTHESIA EVALUATION ADULT - NSANTHBPHIGHRD_ENT_A_CORE
Yes FEMALE TIFFANIE     3m2w (2018)    Female    [id number]       [facility] [address]    Admitted: 02-18-18 (109d)  REASON FOR ADMISSION: CHEMOTHERAPY / COUNT RECOVERY    T(C): 36.4 (06-07-18 @ 06:44), Max: 36.8 (06-06-18 @ 10:49)  HR: 147 (06-07-18 @ 06:44) (117 - 151)  BP: 90/55 (06-07-18 @ 06:44) (90/55 - 102/42)  RR: 40 (06-07-18 @ 06:44) (32 - 40)  SpO2: 100% (06-07-18 @ 06:44) (100% - 100%)    INFANT B ALL - ENCOUNTER FOR ANTINEOPLASTIC CHEMOTHERAPY  Protocol: [id number]  Cycle: CONSOLIDATION  Day: 33  a. Continue chemotherapy as per protocol – no further therapy until day 42 bone marrow               10.5   1.97  )-----------( 224      ( 06 Jun 2018 22:00 )             29.9   Bax     N59.4  L17.3  M21.3  E2.0    Auto Neutrophil #: 1.17 K/uL (06-06-18 @ 22:00)    alteplase  IntraCatheter Injection - Peds 0.5 milliGRAM(s) IntraCatheter once  heparin flush 10 Units/mL IntraVenous Injection - Peds 3 milliLiter(s) IV Push daily PRN    a. Transfuse leukodepleted and irradiated packed red blood cells if hemoglobin <8g/dl  b. Transfuse single donor platelets if platelet count <10,000/mcl    IMMUNODEFICIENCY SECONDARY TO CHEMOTHERAPY –  RHINOVIRUS / ENTEROVIRUS PCR (+) 6/1/  INDWELLING DL BROVIAC PLACED 2018 -   cefepime  IV Intermittent - Peds 300 milliGRAM(s) IV Intermittent every 8 hours  vancomycin IV Intermittent - Peds 90 milliGRAM(s) IV Intermittent every 6 hours  acyclovir  Oral Liquid - Peds 55 milliGRAM(s) Oral <User Schedule>  fluconAZOLE  Oral Liquid - Peds 35 milliGRAM(s) Oral every 24 hours  ethanol Lock - Peds 0.7 milliLiter(s) Catheter <User Schedule>  ethanol Lock - Peds 0.6 milliLiter(s) Catheter <User Schedule>  pentamidine IV Intermittent - Peds 23 milliGRAM(s) IV Intermittent every 2 weeks – last 5/30    Vancomycin Level, Trough: 9.9 ug/mL (06-04-18 @ 11:07)  Vancomycin Level, Trough: 9.4 ug/mL (05-28-18 @ 05:00)    a. Continue Pentamidine for PJP prophylaxis  b. Continue oral care bundle as per institutional protocol  c. Continue high-risk CLABSI bundle as per institutional protocol, including ethanol locks  d. Obtain daily blood cultures if febrile.        CHEMOTHERAPY INDUCED NAUSEA  ondansetron  Oral Liquid - Peds 0.9 milliGRAM(s) Oral every 8 hours PRN  hydrOXYzine  Oral Liquid - Peds 3 milliGRAM(s) Oral every 6 hours PRN  LORazepam IV Intermittent - Peds 0.25 milliGRAM(s) IV Intermittent every 6 hours PRN  lansoprazole   Oral  Liquid - Peds 7.5 milliGRAM(s) Oral daily  simethicone Oral Drops - Peds 20 milliGRAM(s) Oral three times a day PRN    a. Currently well-controlled. Continue antiemetics as currently prescribed.    MANAGEMENT OF ELECTROLYTES AND FEEDING CHALLENGES  06-06-18 @ 07:01  -  06-07-18 @ 07:00  --------------------------------------------------------  IN: 978.5 mL / OUT: 898 mL / NET: 80.5 mL  Weight (kg): 6.02 (06-03-18 @ 06:34)  06-06  136  |  103  |  9   ----------------------------<  79  3.9   |  21<L>  |  < 0.20<L>  Ca    9.4      06 Jun 2018 22:00; Phos  5.7     06-06; Mg     2.1     06-06    TPro  5.2<L>  /  Alb  3.4  /  TBili  0.3  /  DBili  x   /  AST  28  /  ALT  28  /  AlkPhos  326  06-06    a. Continue oral/NG diet as tolerated with Alimentum 115 ml every 4 hours PO / gavage  b. Continue to obtain daily weights  c. Continue current intravenous fluids and electrolyte supplementation    HYPERTENSION -   a. Continue:  amLODIPine Oral Liquid - Peds 0.6 milliGRAM(s) Oral daily  hydrALAZINE  Oral Liquid - Peds 0.58 milliGRAM(s) Oral every 6 hours PRN  NIFEdipine Oral Liquid - Peds 0.6 milliGRAM(s) Oral every 6 hours PRN  b. Repeat ultrasound 6/6 showed NO renal artery stenosis

## 2024-08-05 NOTE — PRE-ANESTHESIA EVALUATION ADULT - NSANTHPMHFT_GEN_ALL_CORE
denies any reflux, nausea or vomiting  held last dose of ozempic  h/o bifascicular block  mild enlarged thoracic aortic aneurysm noted on echo, recent cardiac eval in the chart

## 2024-08-08 LAB — SURGICAL PATHOLOGY STUDY: SIGNIFICANT CHANGE UP

## 2024-08-14 ENCOUNTER — APPOINTMENT (OUTPATIENT)
Dept: SURGERY | Facility: CLINIC | Age: 84
End: 2024-08-14

## 2024-08-14 VITALS
TEMPERATURE: 97.3 F | HEIGHT: 63 IN | RESPIRATION RATE: 15 BRPM | DIASTOLIC BLOOD PRESSURE: 74 MMHG | WEIGHT: 127 LBS | HEART RATE: 59 BPM | BODY MASS INDEX: 22.5 KG/M2 | OXYGEN SATURATION: 99 % | SYSTOLIC BLOOD PRESSURE: 134 MMHG

## 2024-08-14 DIAGNOSIS — K40.90 UNILATERAL INGUINAL HERNIA, W/OUT OBSTRUCTION OR GANGRENE, NOT SPECIFIED AS RECURRENT: ICD-10-CM

## 2024-08-14 PROCEDURE — 99024 POSTOP FOLLOW-UP VISIT: CPT

## 2024-08-14 NOTE — HISTORY OF PRESENT ILLNESS
[de-identified] : KELSIE is a 83 year old female here for s/p Repair of right inguinal hernia with mesh. 08/05/24 [de-identified] : This patient complains only of moderate incisional pain which has been improving slowly.

## 2024-08-14 NOTE — ASSESSMENT
[FreeTextEntry1] : This patient has been reassured that the soft tissue swelling and induration about the wound will be resolving in the upcoming weeks to months.  I will see her again on an as-needed basis
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
## 2024-08-27 ENCOUNTER — APPOINTMENT (OUTPATIENT)
Dept: ORTHOPEDIC SURGERY | Facility: CLINIC | Age: 84
End: 2024-08-27

## 2024-08-27 NOTE — HISTORY OF PRESENT ILLNESS
[FreeTextEntry1] : The patient is 82 yo HD female presents as a NEW PATIENT for hand evaluation referred by Cristino choe MD. The patient

## 2024-08-27 NOTE — HISTORY OF PRESENT ILLNESS
[FreeTextEntry1] : The patient is 84 yo HD female presents as a NEW PATIENT for hand evaluation referred by Cristino choe MD. The patient

## 2024-09-03 ENCOUNTER — APPOINTMENT (OUTPATIENT)
Dept: ORTHOPEDIC SURGERY | Facility: CLINIC | Age: 84
End: 2024-09-03
Payer: MEDICARE

## 2024-09-03 VITALS
BODY MASS INDEX: 22.15 KG/M2 | OXYGEN SATURATION: 98 % | DIASTOLIC BLOOD PRESSURE: 75 MMHG | HEIGHT: 63 IN | SYSTOLIC BLOOD PRESSURE: 129 MMHG | HEART RATE: 60 BPM | WEIGHT: 125 LBS

## 2024-09-03 DIAGNOSIS — M19.041 PRIMARY OSTEOARTHRITIS, RIGHT HAND: ICD-10-CM

## 2024-09-03 DIAGNOSIS — M19.042 PRIMARY OSTEOARTHRITIS, LEFT HAND: ICD-10-CM

## 2024-09-03 DIAGNOSIS — M18.11 UNILATERAL PRIMARY OSTEOARTHRITIS OF FIRST CARPOMETACARPAL JOINT, RIGHT HAND: ICD-10-CM

## 2024-09-03 DIAGNOSIS — M11.231 OTHER CHONDROCALCINOSIS, RIGHT WRIST: ICD-10-CM

## 2024-09-03 DIAGNOSIS — M18.12 UNILATERAL PRIMARY OSTEOARTHRITIS OF FIRST CARPOMETACARPAL JOINT, LEFT HAND: ICD-10-CM

## 2024-09-03 PROCEDURE — 73110 X-RAY EXAM OF WRIST: CPT | Mod: RT

## 2024-09-03 PROCEDURE — 99213 OFFICE O/P EST LOW 20 MIN: CPT

## 2024-09-04 PROBLEM — M19.041 PRIMARY OSTEOARTHRITIS OF RIGHT HAND: Status: ACTIVE | Noted: 2024-09-04

## 2024-09-04 PROBLEM — M11.231 CHONDROCALCINOSIS OF RIGHT WRIST: Status: ACTIVE | Noted: 2024-09-04

## 2024-09-04 PROBLEM — M19.042 PRIMARY OSTEOARTHRITIS OF LEFT HAND: Status: ACTIVE | Noted: 2024-09-04

## 2024-09-04 NOTE — ASSESSMENT
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 There is faint chondrocalcinosis. Even though there is STT arthritis radiographically, clinically patient has pain at right basal joint provoked by manipulation and from dorsal volar shear although the joint space appears maintained on radiographs.  Patient provided wrist support splint to wear as needed.  Patient unable to take oral NSAIDs.  Topical diclofenac can be used as needed although is not likely to be particularly effective. Because pain can be from anyone or a combination of 3 locations cortisone injection unlikely to be helpful. Patient has osteoarthritis of multiple other joints including MP3, erosive arthritis all DIP joints and thumb IP joint, for example.  These do not require intervention unless symptomatic.  I have explained to patient that she has arthritis in multiple joints throughout the hand.  The wrist pain/base of thumb pain is her primary problem.  Wrist support splint provided which will markedly reduce wrist motion and may limit thumb motion to some degree as well.  If this is satisfactory no further immobilization or splinting required.  If patient has ongoing symptoms that interfere with function, the patient should return. Prognosis uncertain/limited. A lengthy and detailed discussion was held with the patient regarding analysis, treatment, and recommendations. All questions have been answered. At the conclusion the patient expressed acceptance, understanding and agreement with the plan.

## 2024-09-04 NOTE — CONSULT LETTER
[Dear  ___] : Dear  [unfilled], [Consult Letter:] : I had the pleasure of evaluating your patient, [unfilled]. [FreeTextEntry1] : The patient was seen in hand consultation today. A copy of my office note is enclosed for your review with the patient's knowledge and consent.  Sincerely,  Michael Godoy MD Chief, Hand Surgery Residency  ([phone]) Department of Orthopaedic Surgery  [facility] Professor of Orthopaedic Surgery Fadumo SAMANO at [facility]

## 2024-09-04 NOTE — PHYSICAL EXAM
[de-identified] : Right wrist No notable swelling. E/F50/50 without pain or tenderness.  Right basal joint: Dorsally prominent with first metacarpal adduction. Joint line tenderness 1+. Manipulation: 1+ crepitus, 1-2+ pain, minimal laxity. Pain recreates thumb pain. STT joint not obviously tender.  Right hand No pertinent MP and PIP joint contributory findings. DIP joints: Heberden's nodes all digits with deformities No A1 pulley tenderness and no triggering in any finger. No additional pertinent positive contributory findings.  Left wrist E/F 50/50 degrees without notable pain or positive findings. Left basal joint manipulation slight crepitus minimal discomfort.  Left hand No pertinent MP and PIP joint contributory findings. DIP joints: Heberden's nodes all digits, many with deformity. No A1 pulley tenderness and no triggering in any finger. No additional  Neurologic: Median, ulnar, and radial motor and sensory are intact.  Skin: No cyanosis, clubbing, or rashes. Vascular: Radial pulses intact. Lymphatic: No streaking or epitrochlear adenopathy. The patient is awake, alert, and oriented. Affect appropriate. Cooperative.  [de-identified] : Radiographs ordered and interpreted by me today, reviewed and discussed with the patient today. 4 views right wrist and hand. STT joint sclerosis and narrowing consistent with STT arthritis. Scapholunate gap greater than 10 mm. Radial scaphoid and radiolunate spaces maintained. Other than STT, remainder of midcarpal joint space maintained. Scapholunate angle approximately 90 degrees. Because hand extended lunate appears dorsally angulated but it is in line with capitate.   Despite appearance of lunate on lateral radiograph, no DISI given lunate capitate alignment. Faint radiodensity in region of TFCC suggestive of very mild chondrocalcinosis. Minimal basal joint narrowing without notable osteophyte formation. MP3 narrowing consistent with osteoarthritis.   Erosive osteoarthritis thumb IP joint, DIP 2, 3, 4, 5. Minimal narrowing PIP 3. No fractures or dislocations.

## 2024-09-04 NOTE — PHYSICAL EXAM
[de-identified] : Right wrist No notable swelling. E/F50/50 without pain or tenderness.  Right basal joint: Dorsally prominent with first metacarpal adduction. Joint line tenderness 1+. Manipulation: 1+ crepitus, 1-2+ pain, minimal laxity. Pain recreates thumb pain. STT joint not obviously tender.  Right hand No pertinent MP and PIP joint contributory findings. DIP joints: Heberden's nodes all digits with deformities No A1 pulley tenderness and no triggering in any finger. No additional pertinent positive contributory findings.  Left wrist E/F 50/50 degrees without notable pain or positive findings. Left basal joint manipulation slight crepitus minimal discomfort.  Left hand No pertinent MP and PIP joint contributory findings. DIP joints: Heberden's nodes all digits, many with deformity. No A1 pulley tenderness and no triggering in any finger. No additional  Neurologic: Median, ulnar, and radial motor and sensory are intact.  Skin: No cyanosis, clubbing, or rashes. Vascular: Radial pulses intact. Lymphatic: No streaking or epitrochlear adenopathy. The patient is awake, alert, and oriented. Affect appropriate. Cooperative.  [de-identified] : Radiographs ordered and interpreted by me today, reviewed and discussed with the patient today. 4 views right wrist and hand. STT joint sclerosis and narrowing consistent with STT arthritis. Scapholunate gap greater than 10 mm. Radial scaphoid and radiolunate spaces maintained. Other than STT, remainder of midcarpal joint space maintained. Scapholunate angle approximately 90 degrees. Because hand extended lunate appears dorsally angulated but it is in line with capitate.   Despite appearance of lunate on lateral radiograph, no DISI given lunate capitate alignment. Faint radiodensity in region of TFCC suggestive of very mild chondrocalcinosis. Minimal basal joint narrowing without notable osteophyte formation. MP3 narrowing consistent with osteoarthritis.   Erosive osteoarthritis thumb IP joint, DIP 2, 3, 4, 5. Minimal narrowing PIP 3. No fractures or dislocations.

## 2024-09-04 NOTE — CONSULT LETTER
[Dear  ___] : Dear  [unfilled], [Consult Letter:] : I had the pleasure of evaluating your patient, [unfilled]. [FreeTextEntry1] : The patient was seen in hand consultation today. A copy of my office note is enclosed for your review with the patient's knowledge and consent.  Sincerely,  Michael Godoy MD Chief, Hand Surgery Residency  ([phone]) Department of Orthopaedic Surgery  [facility] Professor of Orthopaedic Surgery Fadumo SAMANO at [facility]

## 2024-09-04 NOTE — CONSULT LETTER
[Dear  ___] : Dear  [unfilled], [Consult Letter:] : I had the pleasure of evaluating your patient, [unfilled]. [FreeTextEntry1] : The patient was seen in hand consultation today. A copy of my office note is enclosed for your review with the patient's knowledge and consent.  Sincerely,  Michael Godoy MD Chief, Hand Surgery Residency  ([id number]) Department of Orthopaedic Surgery  [facility] Professor of Orthopaedic Surgery Fadumo SAMANO at [facility]

## 2024-09-04 NOTE — PHYSICAL EXAM
[de-identified] : Right wrist No notable swelling. E/F50/50 without pain or tenderness.  Right basal joint: Dorsally prominent with first metacarpal adduction. Joint line tenderness 1+. Manipulation: 1+ crepitus, 1-2+ pain, minimal laxity. Pain recreates thumb pain. STT joint not obviously tender.  Right hand No pertinent MP and PIP joint contributory findings. DIP joints: Heberden's nodes all digits with deformities No A1 pulley tenderness and no triggering in any finger. No additional pertinent positive contributory findings.  Left wrist E/F 50/50 degrees without notable pain or positive findings. Left basal joint manipulation slight crepitus minimal discomfort.  Left hand No pertinent MP and PIP joint contributory findings. DIP joints: Heberden's nodes all digits, many with deformity. No A1 pulley tenderness and no triggering in any finger. No additional  Neurologic: Median, ulnar, and radial motor and sensory are intact.  Skin: No cyanosis, clubbing, or rashes. Vascular: Radial pulses intact. Lymphatic: No streaking or epitrochlear adenopathy. The patient is awake, alert, and oriented. Affect appropriate. Cooperative.  [de-identified] : Radiographs ordered and interpreted by me today, reviewed and discussed with the patient today. 4 views right wrist and hand. STT joint sclerosis and narrowing consistent with STT arthritis. Scapholunate gap greater than 10 mm. Radial scaphoid and radiolunate spaces maintained. Other than STT, remainder of midcarpal joint space maintained. Scapholunate angle approximately 90 degrees. Because hand extended lunate appears dorsally angulated but it is in line with capitate.   Despite appearance of lunate on lateral radiograph, no DISI given lunate capitate alignment. Faint radiodensity in region of TFCC suggestive of very mild chondrocalcinosis. Minimal basal joint narrowing without notable osteophyte formation. MP3 narrowing consistent with osteoarthritis.   Erosive osteoarthritis thumb IP joint, DIP 2, 3, 4, 5. Minimal narrowing PIP 3. No fractures or dislocations.

## 2024-09-04 NOTE — HISTORY OF PRESENT ILLNESS
[FreeTextEntry1] : The patient is 84 yo RHD female presents as a NEW PATIENT for hand evaluation referred by Cristino choe MD. The patient initially had office appointment scheduled 8/27/2024.  That appointment was canceled and rescheduled for today. 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  TODAY:  The patient presents with pain at the base of right thumb, several years duration progressively worse. Patient tried Voltaren gel "a few times" and stopped it because she felt that it was not beneficial. Patient is having pain primarily on the right and not notable pain on the left at the basal joint. Patient not consciously aware of numbness or tingling. Is unable to take oral NSAIDs because of GI/esophageal disorder.  Patient has had a number of joint replacements: Bilateral shoulders, right knee, left hip, and low back surgery. Patient not aware of numbness or tingling. Patient denies any triggering.

## 2024-09-04 NOTE — HISTORY OF PRESENT ILLNESS
[FreeTextEntry1] : The patient is 82 yo RHD female presents as a NEW PATIENT for hand evaluation referred by Cristino choe MD. The patient initially had office appointment scheduled 8/27/2024.  That appointment was canceled and rescheduled for today. 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  TODAY:  The patient presents with pain at the base of right thumb, several years duration progressively worse. Patient tried Voltaren gel "a few times" and stopped it because she felt that it was not beneficial. Patient is having pain primarily on the right and not notable pain on the left at the basal joint. Patient not consciously aware of numbness or tingling. Is unable to take oral NSAIDs because of GI/esophageal disorder.  Patient has had a number of joint replacements: Bilateral shoulders, right knee, left hip, and low back surgery. Patient not aware of numbness or tingling. Patient denies any triggering.

## 2024-09-11 ENCOUNTER — NON-APPOINTMENT (OUTPATIENT)
Age: 84
End: 2024-09-11

## 2024-09-11 ENCOUNTER — APPOINTMENT (OUTPATIENT)
Dept: SURGERY | Facility: CLINIC | Age: 84
End: 2024-09-11
Payer: MEDICARE

## 2024-09-11 VITALS
HEIGHT: 63 IN | SYSTOLIC BLOOD PRESSURE: 150 MMHG | TEMPERATURE: 97.3 F | WEIGHT: 125 LBS | OXYGEN SATURATION: 99 % | RESPIRATION RATE: 16 BRPM | HEART RATE: 55 BPM | DIASTOLIC BLOOD PRESSURE: 78 MMHG | BODY MASS INDEX: 22.15 KG/M2

## 2024-09-11 DIAGNOSIS — K40.90 UNILATERAL INGUINAL HERNIA, W/OUT OBSTRUCTION OR GANGRENE, NOT SPECIFIED AS RECURRENT: ICD-10-CM

## 2024-09-11 PROCEDURE — 99024 POSTOP FOLLOW-UP VISIT: CPT

## 2024-09-11 NOTE — PHYSICAL EXAM
[de-identified] : Soft and nontender.  The wound is clean dry and healing well.  There is no evidence for recurrence, fluid collection or any other problem.

## 2024-09-11 NOTE — ASSESSMENT
[FreeTextEntry1] : If this patient represents with groin pain I will consider ordering some imaging.  Otherwise I will see her again on an as-needed basis

## 2024-09-11 NOTE — HISTORY OF PRESENT ILLNESS
[de-identified] : KELSIE is a 84 year old female here for pain in the incision site. s/p Repair of right inguinal hernia with mesh. 08/05/24. [de-identified] : This 84-year-old woman underwent a right inguinal hernia repair on August 5.  She called the office a couple weeks ago with complaints of severe pain in the right groin and right hip.  The pain lasted 4 days then subsided.  Today she feels her pain has resolved 90%.  She is taking no analgesics

## 2024-09-16 NOTE — ASU DISCHARGE PLAN (ADULT/PEDIATRIC) - NS DC INTERPRETER YES NO
Progress Note - Hospitalist   Name: Bryant Lazcano 44 y.o. male I MRN: [id number]  Unit/Bed#: [facility] 412-01 I Date of Admission: 9/13/2024   Date of Service: 9/16/2024 I Hospital Day: 3    Assessment & Plan  Chronic hepatitis C without hepatic coma (HCC)  Check updated labs-pending  IV drug user  Patient presenting with subjective fevers chills nausea chest discomfort diaphoresis, admits to ongoing IV drug abuse last use yesterday  Patient reports he was on long-term antibiotics for for infection in his spine  My primary suspicion is for a bacteremia, will cover with vancomycin , cefepime added for gram negative coverage  Check echocardiogram -reviewed without any vegetations  Check CT chest due to ongoing chest discomfort to sternal area also MRI of cervical spine-imaging is unremarkable    9/15/24: Patient's blood cultures are negative at 24 hours, would expect growth if he had a true bacteremia at this point but will monitor until tomorrow for clearance at 48 hours.  Suspicion that he may have gotten a bad batch of heroin, he reports his friend had similar symptoms after using the same substance  Will monitor off antibiotics  Will consult toxicology for initiation of MAT    9/16/24: Patient continues to have negative blood cultures, cleared for discharge pending initiation of MAT and establishment of outpatient follow-up  he is tolerating Suboxone will continue on 8 mg daily  Type 2 diabetes mellitus without complication, without long-term current use of insulin (HCC)  Lab Results   Component Value Date    HGBA1C 5.2 04/11/2024       Recent Labs     09/13/24  2043 09/14/24  0751   POCGLU 137 148*       Blood Sugar Average: Last 72 hrs:  (P) 142.5      VTE Pharmacologic Prophylaxis: VTE Score: 0 Moderate Risk (Score 3-4) - Pharmacological DVT Prophylaxis Ordered: enoxaparin (Lovenox).    Mobility:   Basic Mobility Inpatient Raw Score: 22  JH-HLM Goal: 7: Walk 25 feet or more  JH-HLM Achieved: 6: Walk 10 steps or  more  JH-HLM Goal achieved. Continue to encourage appropriate mobility.    Patient Centered Rounds: I performed bedside rounds with nursing staff today.   Discussions with Specialists or Other Care Team Provider:     Education and Discussions with Family / Patient: Updated  (significant other) at bedside.    Current Length of Stay: 3 day(s)  Current Patient Status: Inpatient   Certification Statement: The patient will continue to require additional inpatient hospital stay due to dc tomorrow  Discharge Plan: Anticipate discharge in 24-48 hrs to home.    Code Status: Level 1 - Full Code    Subjective   Patient reports feeling remarkably improved over the weekend    Objective     Vitals:   Temp (24hrs), Av.8 °F (37.7 °C), Min:98.3 °F (36.8 °C), Max:100.6 °F (38.1 °C)    Temp:  [98.3 °F (36.8 °C)-100.6 °F (38.1 °C)] 98.3 °F (36.8 °C)  HR:  [] 104  Resp:  [18] 18  BP: (125-190)/() 158/89  SpO2:  [96 %-98 %] 98 %  Body mass index is 31.57 kg/m².     Input and Output Summary (last 24 hours):     Intake/Output Summary (Last 24 hours) at 2024 1355  Last data filed at 2024 0900  Gross per 24 hour   Intake 472 ml   Output 0 ml   Net 472 ml       Physical Exam  Constitutional:       General: He is not in acute distress.     Appearance: He is not toxic-appearing or diaphoretic.   Eyes:      General: No scleral icterus.  Cardiovascular:      Rate and Rhythm: Regular rhythm. Tachycardia present.      Heart sounds: No murmur heard.     No friction rub. No gallop.   Pulmonary:      Effort: No respiratory distress.      Breath sounds: No stridor. No wheezing, rhonchi or rales.   Chest:      Chest wall: No tenderness.   Abdominal:      General: There is no distension.      Palpations: There is no mass.      Tenderness: There is no abdominal tenderness. There is no guarding or rebound.      Hernia: No hernia is present.   Musculoskeletal:         General: No swelling or tenderness.      Right  lower leg: No edema.      Left lower leg: No edema.   Neurological:      Mental Status: He is oriented to person, place, and time.          Lines/Drains:  Lines/Drains/Airways       Active Status       None                            Lab Results: I have reviewed the following results: CBC/BMP:   .     09/16/24  0648   WBC 10.43*   HGB 14.4   HCT 42.2      SODIUM 144   K 3.3*      CO2 28   BUN 15   CREATININE 0.73   GLUC 135       Results from last 7 days   Lab Units 09/16/24  0648   WBC Thousand/uL 10.43*   HEMOGLOBIN g/dL 14.4   HEMATOCRIT % 42.2   PLATELETS Thousands/uL 152   SEGS PCT % 55   LYMPHO PCT % 32   MONO PCT % 7   EOS PCT % 4     Results from last 7 days   Lab Units 09/16/24  0648   SODIUM mmol/L 144   POTASSIUM mmol/L 3.3*   CHLORIDE mmol/L 107   CO2 mmol/L 28   BUN mg/dL 15   CREATININE mg/dL 0.73   ANION GAP mmol/L 9   CALCIUM mg/dL 9.4   ALBUMIN g/dL 4.1   TOTAL BILIRUBIN mg/dL 0.95   ALK PHOS U/L 84   ALT U/L 61*   AST U/L 53*   GLUCOSE RANDOM mg/dL 135     Results from last 7 days   Lab Units 09/13/24  0616   INR  1.01     Results from last 7 days   Lab Units 09/14/24  0751 09/13/24  2043   POC GLUCOSE mg/dl 148* 137         Results from last 7 days   Lab Units 09/13/24  0640 09/13/24  0616   LACTIC ACID mmol/L 1.8  --    PROCALCITONIN ng/ml  --  0.07       Recent Cultures (last 7 days):   Results from last 7 days   Lab Units 09/13/24  0928 09/13/24  0640 09/13/24  0631   BLOOD CULTURE  No Growth at 72 hrs. No Growth at 72 hrs. No Growth at 72 hrs.       Imaging Review: No pertinent imaging studies reviewed.  Other Studies: No additional pertinent studies reviewed.    Last 24 Hours Medication List:     Current Facility-Administered Medications:     acetaminophen (TYLENOL) tablet 975 mg, Q8H MITZI    buprenorphine-naloxone (Suboxone) film 4 mg, Once    [START ON 9/17/2024] buprenorphine-naloxone (Suboxone) film 8 mg, Daily    cloNIDine (CATAPRES) tablet 0.2 mg, Q8H [facility]    cyclobenzaprine  (FLEXERIL) tablet 10 mg, TID    diazepam (VALIUM) injection 10 mg, Q6H PRN    doxylamine (UNISOM) tablet 25 mg, HS PRN    enoxaparin (LOVENOX) subcutaneous injection 40 mg, Daily    gabapentin (NEURONTIN) capsule 300 mg, TID    hydrALAZINE (APRESOLINE) tablet 25 mg, Q12H    labetalol (NORMODYNE) injection 10 mg, Q6H PRN    lisinopril (ZESTRIL) tablet 20 mg, Daily    nicotine (NICODERM CQ) 14 mg/24hr TD 24 hr patch 1 patch, Daily    ondansetron (ZOFRAN) injection 4 mg, Q6H PRN    pantoprazole (PROTONIX) injection 40 mg, Q24H MITZI    traZODone (DESYREL) tablet 50 mg, HS    Administrative Statements   Today, Patient Was Seen By: Emir David DO      **Please Note: This note may have been constructed using a voice recognition system.**   No

## 2024-10-07 ENCOUNTER — NON-APPOINTMENT (OUTPATIENT)
Age: 84
End: 2024-10-07

## 2024-10-07 ENCOUNTER — APPOINTMENT (OUTPATIENT)
Dept: INTERNAL MEDICINE | Facility: CLINIC | Age: 84
End: 2024-10-07
Payer: MEDICARE

## 2024-10-07 ENCOUNTER — LABORATORY RESULT (OUTPATIENT)
Age: 84
End: 2024-10-07

## 2024-10-07 VITALS
HEIGHT: 63 IN | WEIGHT: 129 LBS | BODY MASS INDEX: 22.86 KG/M2 | DIASTOLIC BLOOD PRESSURE: 80 MMHG | HEART RATE: 70 BPM | SYSTOLIC BLOOD PRESSURE: 125 MMHG | RESPIRATION RATE: 14 BRPM

## 2024-10-07 DIAGNOSIS — E78.5 HYPERLIPIDEMIA, UNSPECIFIED: ICD-10-CM

## 2024-10-07 DIAGNOSIS — Z00.00 ENCOUNTER FOR GENERAL ADULT MEDICAL EXAMINATION W/OUT ABNORMAL FINDINGS: ICD-10-CM

## 2024-10-07 DIAGNOSIS — K21.9 GASTRO-ESOPHAGEAL REFLUX DISEASE W/OUT ESOPHAGITIS: ICD-10-CM

## 2024-10-07 DIAGNOSIS — E03.9 HYPOTHYROIDISM, UNSPECIFIED: ICD-10-CM

## 2024-10-07 DIAGNOSIS — I10 ESSENTIAL (PRIMARY) HYPERTENSION: ICD-10-CM

## 2024-10-07 DIAGNOSIS — I71.20 THORACIC AORTIC ANEURYSM, WITHOUT RUPTURE, UNSPECIFIED: ICD-10-CM

## 2024-10-07 PROCEDURE — G0439: CPT

## 2024-10-07 PROCEDURE — 99213 OFFICE O/P EST LOW 20 MIN: CPT | Mod: 25

## 2024-10-07 PROCEDURE — 36415 COLL VENOUS BLD VENIPUNCTURE: CPT

## 2024-10-07 PROCEDURE — 99497 ADVNCD CARE PLAN 30 MIN: CPT

## 2024-10-07 PROCEDURE — 93000 ELECTROCARDIOGRAM COMPLETE: CPT

## 2024-10-08 ENCOUNTER — NON-APPOINTMENT (OUTPATIENT)
Age: 84
End: 2024-10-08

## 2024-10-08 LAB
ALBUMIN SERPL ELPH-MCNC: 4.1 G/DL
ALP BLD-CCNC: 80 U/L
ALT SERPL-CCNC: 27 U/L
ANION GAP SERPL CALC-SCNC: 12 MMOL/L
APPEARANCE: CLEAR
AST SERPL-CCNC: 24 U/L
BASOPHILS # BLD AUTO: 0.04 K/UL
BASOPHILS NFR BLD AUTO: 0.6 %
BILIRUB SERPL-MCNC: 0.5 MG/DL
BILIRUBIN URINE: NEGATIVE
BLOOD URINE: NEGATIVE
BUN SERPL-MCNC: 28 MG/DL
CALCIUM SERPL-MCNC: 9.6 MG/DL
CHLORIDE SERPL-SCNC: 99 MMOL/L
CHOLEST SERPL-MCNC: 169 MG/DL
CO2 SERPL-SCNC: 28 MMOL/L
COLOR: YELLOW
CREAT SERPL-MCNC: 0.56 MG/DL
EGFR: 89 ML/MIN/1.73M2
EOSINOPHIL # BLD AUTO: 0.09 K/UL
EOSINOPHIL NFR BLD AUTO: 1.3 %
ESTIMATED AVERAGE GLUCOSE: 88 MG/DL
GLUCOSE QUALITATIVE U: NEGATIVE MG/DL
GLUCOSE SERPL-MCNC: 91 MG/DL
HBA1C MFR BLD HPLC: 4.7 %
HCT VFR BLD CALC: 41.5 %
HDLC SERPL-MCNC: 73 MG/DL
HGB BLD-MCNC: 14.2 G/DL
IMM GRANULOCYTES NFR BLD AUTO: 0.4 %
KETONES URINE: NEGATIVE MG/DL
LDLC SERPL CALC-MCNC: 86 MG/DL
LEUKOCYTE ESTERASE URINE: ABNORMAL
LYMPHOCYTES # BLD AUTO: 1.93 K/UL
LYMPHOCYTES NFR BLD AUTO: 27.1 %
MAN DIFF?: NORMAL
MCHC RBC-ENTMCNC: 33.6 PG
MCHC RBC-ENTMCNC: 34.2 GM/DL
MCV RBC AUTO: 98.3 FL
MONOCYTES # BLD AUTO: 0.51 K/UL
MONOCYTES NFR BLD AUTO: 7.2 %
NEUTROPHILS # BLD AUTO: 4.51 K/UL
NEUTROPHILS NFR BLD AUTO: 63.4 %
NITRITE URINE: NEGATIVE
NONHDLC SERPL-MCNC: 96 MG/DL
PH URINE: 6
PLATELET # BLD AUTO: 202 K/UL
POTASSIUM SERPL-SCNC: 4.4 MMOL/L
PROT SERPL-MCNC: 6.3 G/DL
PROTEIN URINE: NEGATIVE MG/DL
RBC # BLD: 4.22 M/UL
RBC # FLD: 13.2 %
SODIUM SERPL-SCNC: 140 MMOL/L
SPECIFIC GRAVITY URINE: 1.02
TRIGL SERPL-MCNC: 51 MG/DL
TSH SERPL-ACNC: 1.13 UIU/ML
UROBILINOGEN URINE: 0.2 MG/DL
WBC # FLD AUTO: 7.11 K/UL

## 2024-10-22 ENCOUNTER — OUTPATIENT (OUTPATIENT)
Dept: OUTPATIENT SERVICES | Facility: HOSPITAL | Age: 84
LOS: 1 days | End: 2024-10-22
Payer: MEDICARE

## 2024-10-22 ENCOUNTER — APPOINTMENT (OUTPATIENT)
Dept: CT IMAGING | Facility: IMAGING CENTER | Age: 84
End: 2024-10-22
Payer: MEDICARE

## 2024-10-22 DIAGNOSIS — Z96.651 PRESENCE OF RIGHT ARTIFICIAL KNEE JOINT: Chronic | ICD-10-CM

## 2024-10-22 DIAGNOSIS — Z98.1 ARTHRODESIS STATUS: Chronic | ICD-10-CM

## 2024-10-22 DIAGNOSIS — I71.20 THORACIC AORTIC ANEURYSM, WITHOUT RUPTURE, UNSPECIFIED: ICD-10-CM

## 2024-10-22 DIAGNOSIS — Z98.890 OTHER SPECIFIED POSTPROCEDURAL STATES: Chronic | ICD-10-CM

## 2024-10-22 PROCEDURE — 71260 CT THORAX DX C+: CPT | Mod: 26,MH

## 2024-10-23 ENCOUNTER — NON-APPOINTMENT (OUTPATIENT)
Age: 84
End: 2024-10-23

## 2024-10-23 ENCOUNTER — APPOINTMENT (OUTPATIENT)
Dept: INTERNAL MEDICINE | Facility: CLINIC | Age: 84
End: 2024-10-23
Payer: MEDICARE

## 2024-10-23 DIAGNOSIS — U07.1 COVID-19: ICD-10-CM

## 2024-10-23 PROCEDURE — 99442: CPT | Mod: 93

## 2024-11-03 ENCOUNTER — EMERGENCY (EMERGENCY)
Facility: HOSPITAL | Age: 84
LOS: 1 days | Discharge: HOME | End: 2024-11-03
Attending: STUDENT IN AN ORGANIZED HEALTH CARE EDUCATION/TRAINING PROGRAM | Admitting: STUDENT IN AN ORGANIZED HEALTH CARE EDUCATION/TRAINING PROGRAM
Payer: MEDICARE

## 2024-11-03 VITALS
HEART RATE: 83 BPM | OXYGEN SATURATION: 97 % | WEIGHT: 125 LBS | HEIGHT: 63 IN | RESPIRATION RATE: 20 BRPM | SYSTOLIC BLOOD PRESSURE: 153 MMHG | DIASTOLIC BLOOD PRESSURE: 96 MMHG | TEMPERATURE: 98 F

## 2024-11-03 DIAGNOSIS — Z98.1 ARTHRODESIS STATUS: Chronic | ICD-10-CM

## 2024-11-03 DIAGNOSIS — Z98.890 OTHER SPECIFIED POSTPROCEDURAL STATES: Chronic | ICD-10-CM

## 2024-11-03 DIAGNOSIS — Z96.651 PRESENCE OF RIGHT ARTIFICIAL KNEE JOINT: Chronic | ICD-10-CM

## 2024-11-03 DIAGNOSIS — Z96.642 PRESENCE OF LEFT ARTIFICIAL HIP JOINT: Chronic | ICD-10-CM

## 2024-11-03 LAB
ALBUMIN SERPL ELPH-MCNC: 3.8 G/DL — SIGNIFICANT CHANGE UP (ref 3.3–5)
ALP SERPL-CCNC: 66 U/L — SIGNIFICANT CHANGE UP (ref 40–120)
ALT FLD-CCNC: 36 U/L — HIGH (ref 4–33)
ANION GAP SERPL CALC-SCNC: 10 MMOL/L — SIGNIFICANT CHANGE UP (ref 7–14)
AST SERPL-CCNC: 27 U/L — SIGNIFICANT CHANGE UP (ref 4–32)
BASOPHILS # BLD AUTO: 0.03 K/UL — SIGNIFICANT CHANGE UP (ref 0–0.2)
BASOPHILS NFR BLD AUTO: 0.4 % — SIGNIFICANT CHANGE UP (ref 0–2)
BILIRUB SERPL-MCNC: 0.5 MG/DL — SIGNIFICANT CHANGE UP (ref 0.2–1.2)
BUN SERPL-MCNC: 21 MG/DL — SIGNIFICANT CHANGE UP (ref 7–23)
CALCIUM SERPL-MCNC: 9 MG/DL — SIGNIFICANT CHANGE UP (ref 8.4–10.5)
CHLORIDE SERPL-SCNC: 103 MMOL/L — SIGNIFICANT CHANGE UP (ref 98–107)
CO2 SERPL-SCNC: 25 MMOL/L — SIGNIFICANT CHANGE UP (ref 22–31)
CREAT SERPL-MCNC: 0.45 MG/DL — LOW (ref 0.5–1.3)
EGFR: 94 ML/MIN/1.73M2 — SIGNIFICANT CHANGE UP
EOSINOPHIL # BLD AUTO: 0.07 K/UL — SIGNIFICANT CHANGE UP (ref 0–0.5)
EOSINOPHIL NFR BLD AUTO: 0.8 % — SIGNIFICANT CHANGE UP (ref 0–6)
GLUCOSE SERPL-MCNC: 92 MG/DL — SIGNIFICANT CHANGE UP (ref 70–99)
HCT VFR BLD CALC: 41.2 % — SIGNIFICANT CHANGE UP (ref 34.5–45)
HGB BLD-MCNC: 14.3 G/DL — SIGNIFICANT CHANGE UP (ref 11.5–15.5)
IANC: 6.15 K/UL — SIGNIFICANT CHANGE UP (ref 1.8–7.4)
IMM GRANULOCYTES NFR BLD AUTO: 0.2 % — SIGNIFICANT CHANGE UP (ref 0–0.9)
LYMPHOCYTES # BLD AUTO: 1.68 K/UL — SIGNIFICANT CHANGE UP (ref 1–3.3)
LYMPHOCYTES # BLD AUTO: 19.6 % — SIGNIFICANT CHANGE UP (ref 13–44)
MAGNESIUM SERPL-MCNC: 2 MG/DL — SIGNIFICANT CHANGE UP (ref 1.6–2.6)
MCHC RBC-ENTMCNC: 32.1 PG — SIGNIFICANT CHANGE UP (ref 27–34)
MCHC RBC-ENTMCNC: 34.7 G/DL — SIGNIFICANT CHANGE UP (ref 32–36)
MCV RBC AUTO: 92.4 FL — SIGNIFICANT CHANGE UP (ref 80–100)
MONOCYTES # BLD AUTO: 0.6 K/UL — SIGNIFICANT CHANGE UP (ref 0–0.9)
MONOCYTES NFR BLD AUTO: 7 % — SIGNIFICANT CHANGE UP (ref 2–14)
NEUTROPHILS # BLD AUTO: 6.15 K/UL — SIGNIFICANT CHANGE UP (ref 1.8–7.4)
NEUTROPHILS NFR BLD AUTO: 72 % — SIGNIFICANT CHANGE UP (ref 43–77)
NRBC # BLD: 0 /100 WBCS — SIGNIFICANT CHANGE UP (ref 0–0)
NRBC # FLD: 0 K/UL — SIGNIFICANT CHANGE UP (ref 0–0)
PHOSPHATE SERPL-MCNC: 2.7 MG/DL — SIGNIFICANT CHANGE UP (ref 2.5–4.5)
PLATELET # BLD AUTO: 226 K/UL — SIGNIFICANT CHANGE UP (ref 150–400)
POTASSIUM SERPL-MCNC: 4.1 MMOL/L — SIGNIFICANT CHANGE UP (ref 3.5–5.3)
POTASSIUM SERPL-SCNC: 4.1 MMOL/L — SIGNIFICANT CHANGE UP (ref 3.5–5.3)
PROT SERPL-MCNC: 6.4 G/DL — SIGNIFICANT CHANGE UP (ref 6–8.3)
RBC # BLD: 4.46 M/UL — SIGNIFICANT CHANGE UP (ref 3.8–5.2)
RBC # FLD: 12 % — SIGNIFICANT CHANGE UP (ref 10.3–14.5)
SODIUM SERPL-SCNC: 138 MMOL/L — SIGNIFICANT CHANGE UP (ref 135–145)
TROPONIN T, HIGH SENSITIVITY RESULT: 17 NG/L — SIGNIFICANT CHANGE UP
WBC # BLD: 8.55 K/UL — SIGNIFICANT CHANGE UP (ref 3.8–10.5)
WBC # FLD AUTO: 8.55 K/UL — SIGNIFICANT CHANGE UP (ref 3.8–10.5)

## 2024-11-03 PROCEDURE — 99285 EMERGENCY DEPT VISIT HI MDM: CPT | Mod: FS

## 2024-11-03 PROCEDURE — 93010 ELECTROCARDIOGRAM REPORT: CPT

## 2024-11-03 PROCEDURE — 71046 X-RAY EXAM CHEST 2 VIEWS: CPT | Mod: 26

## 2024-11-03 RX ORDER — SODIUM CHLORIDE 9 MG/ML
500 INJECTION, SOLUTION INTRAMUSCULAR; INTRAVENOUS; SUBCUTANEOUS ONCE
Refills: 0 | Status: COMPLETED | OUTPATIENT
Start: 2024-11-03 | End: 2024-11-03

## 2024-11-03 RX ADMIN — SODIUM CHLORIDE 500 MILLILITER(S): 9 INJECTION, SOLUTION INTRAMUSCULAR; INTRAVENOUS; SUBCUTANEOUS at 21:00

## 2024-11-03 NOTE — ED PROVIDER NOTE - CLINICAL SUMMARY MEDICAL DECISION MAKING FREE TEXT BOX
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 Anxiety/ Depression: ,  Barretts Esophagus, pw palpitations and lightheadedness. PW family bedside provides collateral.  Patient reports swam her usual amount tonight.  Some safety labs and community pool.  Reports that left 42 she began feeling lightheaded and fatigued however put herself to complete it.  Reports afterwards felt palpitations, lightheadedness.  Now resolved.  Reports last week had COVID, now asymptomatic she believes.  Reports that she regularly swims 50 laps 2-3 times a week.  Also stays active with other activities.  Denies CP, SOB, cough or congestion.   Patient arrives HDS, well-appearing neurovasc intact.  EKG nonischemic, independently reviewed by me.  Unable to compare to prior given nothing in system since 2015 and not loading.  Patient initially wanted to leave prior to evaluation.  However able to convince patient to stay for blood work, chest x-ray and reassessment.  Suspect overexertion in setting of recent COVID infection.  Do not suspect ACS.  Reports that she had physical and reports clean bill of health.  Unknown last stress/echo.  Labs, imaging, reassess.

## 2024-11-03 NOTE — ED PROVIDER NOTE - NSFOLLOWUPINSTRUCTIONS_ED_ALL_ED_FT
Dizziness  Dizziness is a common problem. It makes you feel unsteady or light-headed. You may feel like you're about to faint. Dizziness can lead to getting hurt if you stumble or fall.    It's more common to feel dizzy if you're an older adult. Many things can cause you to feel dizzy. These include:  Medicines.  Dehydration. This is when there's not enough water in your body.  Illness.  Follow these instructions at home:  Eating and drinking    A person drinking water from a glass.  Drink enough fluid to keep your pee (urine) pale yellow.  This helps keep you from getting dehydrated.  Try to drink more clear fluids, such as water.  Do not drink alcohol.  Try to limit how much caffeine you take in.  Try to limit how much salt, also called sodium, you take in.  Activity    Try not to make quick movements.  Stand up slowly from sitting in a chair. Steady yourself until you feel okay.  In the morning, first sit up on the side of the bed. When you feel okay, hold onto something and slowly stand up. Do this until you know that your balance is okay.  If you need to  one place for a long time, move your legs often. Tighten and relax the muscles in your legs while you're standing.  Do not drive or use machines if you feel dizzy.  Avoid bending down if you feel dizzy. Place items in your home so you can reach them without leaning over.  Lifestyle    Do not smoke, vape, or use products with nicotine or tobacco in them. If you need help quitting, talk with your health care provider.  Try to lower your stress level. You can do this by using methods like yoga or meditation. Talk with your provider if you need help.  General instructions    Watch your dizziness for any changes.  Take your medicines only as told by your provider. Talk with your provider if you think you're dizzy because of a medicine you're taking.  Tell a friend or a family member that you're feeling dizzy. If they spot any changes in your behavior, have them call your provider.    Contact a health care provider if:  Your dizziness doesn't go away, or you have new symptoms.  Your dizziness gets worse.  You feel like you may vomit.  You have trouble hearing.  You have a fever.  You have neck pain or a stiff neck.  You fall or get hurt.    Get help right away if:  You vomit each time you eat or drink.  You have watery poop and can't eat or drink.  You have trouble talking, walking, swallowing, or using your arms, hands, or legs.  You feel very weak.  You're bleeding.  You're not thinking clearly, or you have trouble forming sentences. A friend or family member may spot this.  Your vision changes, or you get a very bad headache.  These symptoms may be an emergency. Call 911 right away.  Do not wait to see if the symptoms will go away.  Do not drive yourself to the hospital.  This information is not intended to replace advice given to you by your health care provider. Make sure you discuss any questions you have with your health care provider.
yes

## 2024-11-03 NOTE — ED ADULT TRIAGE NOTE - IDEAL BODY WEIGHT(KG)
----- Message from MARTIR Woodard sent at 4/7/2022 12:55 PM CDT -----  Previous study 07/12/2020 similar except MR moderate at that time. Now mild. So no changes. EF stable. No WMA   52

## 2024-11-03 NOTE — ED ADULT TRIAGE NOTE - ESI TRIAGE ACUITY LEVEL, MLM
[facility]  ADHD Evaluation  Disclaimer: This note consists of symbols derived from keyboarding, dictation and/or voice recognition software. As a result, there may be errors in the script that have gone undetected. Please consider this when interpreting information found in this chart.         Patient: Will Dodson  YOB: 1988  MRN: [id number]  Date(s) of assessment:  Diagnostic Assessment 1/17/2017, 1/24/2017, Linda self-report and collateral measures scored and interpreted 2/21/2017 and feedback session 2/22/2017    Information about appointment:  Client attended three sessions to aid in determining client's mental health diagnosis or diagnoses and treatment recommendations that best address client concerns. Client records includingmedical were reviewed. A diagnostic assessment was conducted at the initial appointment. Client completed several rating scales to assist in assessing attention-related and other mental health symptoms that may be causing impairments in functioning. Rating scales were also completed by a collateral contact.    Assessment tools:      Linda Adult ADHD Rating Scale-IV: Self and Other Reports (BAARS-IV), Linda Functional Impairment Scale: Self and Other Reports (BFIS), Linda Deficits in Executive Functioning Scale: Self and Other Reports (BDEFS), Patient Health Questionnaire-9 (PHQ-9) and Generalized Anxiety Disorder-7 (ERYN-7)    Assessment Results:    Behavioral Observations:  Pleasant 28-year-old  female,, she has a tendency to fidget, Her feet,squirm in her seat.Reports she has difficulty remembering instructions..    Linda Adult ADHD Rating Scale-IV: Self and Other Reports (BAARS-IV)  The BAARS-IV assesses for symptoms of ADHD that are experienced in one's daily life. This assessment measure includes self and collateral rating scales designed to provide information regarding current and childhood symptoms of ADHD including inattention,  "hyperactivity, and impulsivity. Self-report scores are reported as percentiles. Scores at the 76th-83rd percentile are considered marginal, scores at the 84th-92nd percentile are considered borderline, scores at the 93rd-95th percentile are considered mild, scores at the 96th-98th percentile are considered moderate, and those at the 99th percentile are considered severe. Collateral or \"other\" rating scales are reported as number of symptoms observed in comparison to those reported by the client. Norms and percentile scores are not available for collateral reports.     Current Symptoms Scale--Self Report:   Client completed the self-report inventory of current symptoms. The results indicate that the client's Total ADHD Score was 56 which places her in the 99th percentile for overall ADHD symptoms. In addition, the client endorsed 8/9 (98th percentile) Inattention symptoms, 5/9 (97th percentile) Hyperactivity / Impulsivity symptoms, and 3/9 (88th percentile) Sluggish Cognitive Tempo symptoms. Client indicated that the reported symptoms have resulted in impaired functioning in school, home, work and social relationships. Overall, the results suggest the client is experiencing mild to severe ADHD symptoms. Her difficulties are primarily related to inattention, though she has significant difficulties with hyperactivity and impulsivity as well.    Current Symptoms Scale--Other Report:  Client's sister completed the collateral report inventory of current symptoms. Based on the collateral contact's observation of symptoms, the client demonstrates 8/9 Inattention symptoms, 5/5 Hyperactivity symptoms, 1/4 Impulsivity symptoms, and 2/9 Sluggish Cognitive Tempo symptoms. The client's Total ADHD Score was 55. The collateral contact indicated the client demonstrates impaired functioning in school and social relationships} The collateral- and self-report scores are not significantly different.     Childhood Symptoms " "Scale--Self-Report:  Client completed the self-report inventory of childhood symptoms. The results indicate that the client's Total ADHD Score was 49 which places her in the 95th percentile for overall ADHD symptoms in childhood. In addition, the client endorsed 6/9 (93rd percentile) Inattention symptoms and  4/9 (89th percentile) Hyperactivity-Impulsivity symptoms. Client indicated that the reported symptoms resulted in impaired functioning in school, home and social relationships Overall, the results suggest the client experienced  Mild symptoms of ADHD as a child.     Childhood Symptoms Scale--Other Report:  Client's sister completed the collateral report inventory of childhood symptoms. Based on the collateral contact's recollection of client's childhood symptoms, the client demonstrated 2/9 Inattention symptoms and 3/9 Hyperactivity-Impulsivity symptoms. The client's Total ADHD Score was 42. The collateral contact indicated the client demonstrates impaired functioning in school, home and social relationshipsThe collateral- and self-report scores are not significantly different.                           Linda Functional Impairment Scale: Self and Other Reports (BFIS)  The BFIS is used to assess an individuals' psychosocial impairment in major life/daily activities that may be due to a mental health disorder. This assessment measure includes self and collateral rating scales. Self-report scores are reported as percentiles. Scores at the 76th-83rd percentile are considered marginal, scores at the 84th-92nd percentile are considered borderline, scores at the 93rd-95th percentile are considered mild, scores at the 96th-98th percentile are considered moderate, and those at the 99th percentile are considered severe.Collateral or \"other\" rating scales are reported as number of symptoms observed in comparison to those reported by the client. Norms and percentile scores are not available for collateral reports. " "    Results indicate the client identified impairment (scores at or greater than 93rd percentile) in the following areas: home-family, home-chores, work, social-friends, community activities, education, money management, driving, daily responsibilities and health maintenance The client's Mean Impairment Score was 6.60 (96th percentile) indicating the client is reporting Moderate impairment in functioning across domains. Client's sister completed the collateral rating scale, which indicated discrepant results. The collateral contact's scores were generally lower than the client's report.     Linda Deficits in Executive Functioning Scale (BDEFS)  The BDEFS is a measure used for evaluating dimensions of adult executive functioning in daily life.This assessment measure includes self and collateral rating scales. Self-report scores are reported as percentiles. Scores at the 76th-83rd percentile are considered marginal, scores at the 84th-92nd percentile are considered borderline, scores at the 93rd-95th percentile are considered mild, scores at the 96th-98th percentile are considered moderate, and those at the 99th percentile are considered severe.Collateral or \"other\" rating scales are reported as number of symptoms observed in comparison to those reported by the client. Norms and percentile scores are not available for collateral reports.     Results indicate the client's Total Executive Functioning Score was 207  (95th percentile). The ADHD-Executive Functioning Index score was 32 (98th percentile). These scores suggest the client has Mild deficits in executive functioning. These deficits are likely to be due to ADHD. Results indicate the client identified significant deficits in the following areas: self-management to time 99th percentile . Client's sister completed the collateral rating scale, which indicated similar results. The collateral contact's scores were generally higher than the client's " "report.    Generalized Anxiety Disorder Questionnaire (ERYN-7)  This questionnaire is designed to assess for anxiety in adults.  Based on the score, she is experiencing mild symptoms of anxiety. Client identified the following symptoms of anxiety: worrying about many different things, trouble relaxing and becoming easily annoyed or irritable    Patient Health Questionnaire- 9 (PHQ-9)   This questionnaire is designed to assess for depression in adults.  Based on the score, she is experiencing moderate symptoms of depression. Client identified the following symptoms of depression: lack of interest, difficulty with sleep, poor appetite or overeating and lack of energy.         Summary (based on clinical interview, review of records, test results):  A diagnosis of ADHD requires a persistent pattern of inattention and/or hyperactivity which interferes with functioning or development in multiple areas. Additionally, several symptoms need to be present before age 12. Although life stress and situations may make ADHD more or less problematic over the lifespan one can not \"catch\" ADHD later in life if there was no evidence of it in childhood. The client's case she is reporting mild to moderate symptoms of ADHD as a child and these are corroborated to some extent by her sister's collateral report. She is experiencing moderate to severe symptoms currently. She has some deficits in executive functioning, particularly around time management  And to a lesser extent self restraint. Interestingly enough she is in the average range for self-motivation and only in the borderline range for organization and problem solving difficulties. These difficulties do affect the majority of areas of her life. 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 She has some associated depression and anxiety which I typically see in these individuals. She has done " reasonably well in terms of managing her disorder on her own.Medications may be of some benefit to her, however I would not consider it critical. She will also do well to implement organizational strategies such as those found in some of the reading material I recommended for her. 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 Health conditions  Include disease or illnesses, other health problems that may be short or long lasting,  injuries, mental health or emotional problems, and problems with alcohol or drugs.      Think back over the past 30 days and answer these questions, thinking about how much  difficulty you had doing the following activities. For each question, please Eyak only one  response.     S1 Standing for long periods such as 30 minutes? None = 1   S2 Taking care of household responsibilities? Severe = 4   S3 Learning a new task, for example, learning how to get to a new place? Moderate = 3   S4 How much of a problem do you have joining community activities (for example, festivals, Shinto or other activities) in the same way as anyone else can? Mild = 2   S5 How much have you been emotionally affected by your health problems? Severe = 4           In the past 30 days, how much difficulty did you have in:   S6 Concentrating on doing something for ten minutes? Severe = 4   S7 Walking a long distance such as a kilometer (or equivalent)? Mild = 2   S8 Washing your whole body? None = 1   S9 Getting dressed? None = 1   S10 Dealing with people you do not know? Moderate = 3    S11 Maintaining a friendship? Mild = 2   S12 Your day to day work? Moderate = 3      H1 Overall, in the past 30 days, how many days were these difficulties present? Record number of days 20   H2 In the past 30 days, for how many days were you totally unable to carry out your usual activities or work because of any health condition? Record number of days 20   H3 In the past 30 days, not counting the days that you were totally unable, for how many days did you cut back or reduce your usual activities or work because of any health condition? Record number of days 5           Recommendations:    Schedule an appointment with your physician to discuss a medication evaluation., Access resources through websites, books, and articles such as those provided  in the handout., Consider working with an ADHD  or individual therapist to learn skills to  assist with symptom management, as well as ways to improve relationships,  etc that may have been impacted by your symptoms. , Consider attending workshops or support groups through [employer] ([email or web address]). and Schedule a follow-up appointment with me in about six weeks to review symptoms, treatment involvement, and struggles and/or successes.    Jose Matta     2

## 2024-11-03 NOTE — ED ADULT NURSE NOTE - NSFALLHARMRISKINTERV_ED_ALL_ED

## 2024-11-03 NOTE — ED ADULT NURSE NOTE - OBJECTIVE STATEMENT
Pt received in 26a. Pt alert and oriented x 4,a ambulatory at  baseline. Hx of HTN, HLD, GERD, anxiety. Pt c/o palpitations and lightheadedness that began today status post swimming 50 laps in the pool. Pt reports symptoms have resolved at this time. Pt reports being diagnosed with Covid about 1 week ago. Respirations even and unlabored, NAD. Pt denies chest pain, shortness of breath, N/V/D, headache, weakness, fever, chills,  symptoms. 20G IV in the right forearm, labs drawn and fluids infusing per MD orders. Awaiting xray.

## 2024-11-03 NOTE — ED PROVIDER NOTE - ATTENDING APP SHARED VISIT CONTRIBUTION OF CARE
Chi Angel DO:  patient seen and evaluated with the PA.  I was present for key portions of the History & Physical, and I agree with the Impression & Plan.

## 2024-11-03 NOTE — ED ADULT NURSE NOTE - CHIEF COMPLAINT QUOTE
Pt coming from home, states after swimming 50 laps in the pool she started to have palpitations and dizziness that has resolved since arriving to the ED. Pt states she was diagnosed with COVID a weeka go. Past medical history of HTN, GERD, anxiety, depression.

## 2024-11-03 NOTE — ED ADULT TRIAGE NOTE - CHIEF COMPLAINT QUOTE
Pt coming from home, states after swimming 50 laps in the pool she started to have palpitations and dizziness that have resolved since arriving to the ED. Pt states she was diagnosed with COVID a weeka go. Past medical history of HTN, GERD, anxiety, depression. Pt coming from home, states after swimming 50 laps in the pool she started to have palpitations and dizziness that has resolved since arriving to the ED. Pt states she was diagnosed with COVID a weeka go. Past medical history of HTN, GERD, anxiety, depression.

## 2024-11-20 PROCEDURE — 71260 CT THORAX DX C+: CPT
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
## 2025-04-19 NOTE — PRE PROCEDURE NOTE - PRE PROCEDURE EVALUATION
Attending Physician:            ERWIN                Procedure: EGD    Indication for Procedure: Mendoza's surveillance  ________________________________________________________  PAST MEDICAL & SURGICAL HISTORY:  Murmur, cardiac      History of Mendoza's esophagus  dx: ' 2012      Osteoarthritis      Anxiety and depression  medically managed. Never Hospitalized.      GERD (gastroesophageal reflux disease)      S/P appendectomy  age 8      Fracture  ' 80's    repair of right knee with pinning      H/O shoulder replacement  right 6/2012        Left ' 2017      H/O abdominoplasty  ' 80's      H/O bilateral breast reduction surgery  ' 80's      S/P cataract extraction  '10   Bilateral      S/P tonsillectomy  age 1      S/P cosmetic plastic surgery  Face        ALLERGIES:  dust (Sneezing)  No Known Drug Allergies    HOME MEDICATIONS:  Allegra  180 milligrams: 1 dose(s) orally once a day (at bedtime)  ALPRAZolam 0.5 mg oral tablet: 1 tab(s) orally once a day (at bedtime)  amlodipine-benazepril 5 mg-10 mg oral capsule: 1 cap(s) orally once a day  buPROPion 150 mg/24 hours (XL) oral tablet, extended release: 1 tab(s) orally once a day (at bedtime)  Lexapro 20 mg oral tablet: 1 tab(s) orally once a day (at bedtime)  Lipitor 10 mg oral tablet: 1 tab(s) orally once a day  pantoprazole 40 mg oral delayed release tablet: 1 tab(s) orally once a day (at bedtime)  sucralfate 1 g/10 mL oral suspension: 10 milliliter(s) orally once  Valcyclovir   500 milligrams: 1 dose(s) orally once a day (at bedtime)    AICD/PPM: [ ] yes   [ ] no    PERTINENT LAB DATA:                      PHYSICAL EXAMINATION:    Height (cm): 160  Weight (kg): 68.5  BMI (kg/m2): 26.8  BSA (m2): 1.72T(C): 36.4  HR: 50  BP: 134/76  RR: 16  SpO2: 95%    Constitutional: NAD  HEENT: PERRLA, EOMI,    Neck:  No JVD  Respiratory: CTAB/L  Cardiovascular: S1 and S2  Gastrointestinal: BS+, soft, NT/ND  Extremities: No peripheral edema  Neurological: A/O x 3, no focal deficits  Psychiatric: Normal mood, normal affect  Skin: No rashes    ASA Class: I [ ]  II [x ]  III [ ]  IV [ ]    COMMENTS:    The patient is a suitable candidate for the planned procedure unless box checked [ ]  No, explain:     MEDICARE WELLNESS VISIT + SOAP NOTE    Patient Care Team:  Trevon Wooten DO as PCP - General (Internal Medicine)    Jaaj presents for her Subsequent Annual Medicare Wellness Visit with Medicare Wellness Visit (Patient presents for her subsequent MWV.  Patient is fasting. )   Acute and chronic problems were discussed separately.    Status MWV Topics Details (Refresh Note to Update)    Depression Screening (Trend)   PHQ2 Interpretation Negative          PHQ2: Score = 0      Depression screening is negative no further plan needed.    Blood Pressure  Weight  BMI     /82[At home BP[  Current Weight 204 lbs  body mass index is 35.08 kg/m².  BMI is in obese range.    Journal food intake daily         Advanced Directives on File Yes on file.        Health Risk Assessment  (Click to Review or Edit)   Completed      Falls Risk  Have you had a fall in the past year?................................. No  Do you feel unsteady when standing or walking?........ No  Do you worry about falling?.................................................. No       Tobacco/Alcohol/Drugs Never Smoker      reports no history of alcohol use.   reports no history of drug use.      Opioid Review (Update Meds)    No opioid on med list.      Cognitive/Functional Status  (Optional MOCA  Mini Cog)   No evidence of cognitive dysfunction by direct observation.    Vision and Hearing Screens    Not applicable      Health Maintenance (Link)  See patient instructions and orders           The following items on the Medicare Health Risk Assessment were found to be positive  1.) Do you have an Advance directive, living will, or power of  for health care document that contains your wishes for end of life care?: No     6 a.) How many servings of Fruits and Vegetables do you have each day ( 1 serving = 1 piece of fruit, 1/2 cup fruits or vegetables): 1 per day     6 b.) How many servings of High Fiber / Whole Grain Foods to you have each day ( 1  serving = 1 cup cold cereal, 1/2 cup cooked cereal, 1 slice bread): 1 per day     15.) How confident are you that you can control and manage most of your health problems?: Somewhat confident       I reviewed and updated the past Medical, Surgical, Family, Social History, Allergies and Medications in Epic: Yes    Family History   Problem Relation Age of Onset   • Diabetes Mother    • Hypertension Mother    • Coronary Artery Disease Mother    • Cancer Father         lymphosarcoma   • Cancer, Lung Sister    • Diabetes Sister          SOAP NOTE       Subjective     Jaja is a 83 year old here for Medicare Wellness Visit (Patient presents for her subsequent MWV.  Patient is fasting. )    The patient is an 83-year-old woman here for a Medicare wellness visit.    She underwent cataract surgery on her right eye on Monday and is scheduled for the same procedure on her left eye in 2 weeks. Prescription glasses cause blurriness in the operated eye, while they are necessary for distance vision in the other eye. Instability is experienced when wearing them, necessitating the use of a cane. Elevated blood pressure was noted during her surgery, which normalized after a brief period. A headache and neck pressure were experienced on the day of her surgery, accompanied by shakiness. She expresses a greater fear of losing her sight than her hearing. Combination antibiotic eyedrops and another medication for dry eyes are being used. Aspirin was discontinued a week prior to her first cataract surgery and for 2 weeks following the procedure. Aspirin is taken for cold symptoms or headaches, but ibuprofen and Tylenol are avoided as they are ineffective.    A sensation of blockage in the left ear has been present for several months, despite no head pressure or nasal congestion. A history of measles in childhood resulted in scar tissue in the ear. Immediate pain occurs upon inserting and removing her finger from the ear. No history of sinus  issues, head pressure, nasal congestion, wheezing, or coughing spells is reported, except during episodes of bronchitis    Muscle cramps extending from the knee to the hip, which was replaced in October 2023, are reported. The cramps are particularly severe behind the knee, limiting the ability to drive long distances. Mustard or pickle juice provides some relief. 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 A follow-up visit for the hip last year was normal.    Discoloration in the ankles has been noticed, which may have worsened since last year. Swelling in the feet and legs occurs during hot, humid weather.    Efforts to reduce sugar intake, particularly chocolate, which she enjoys, are being made. A mammogram has been completed and all other screenings are up-to-date. The last colonoscopy was performed in 2017, revealing a few polyps. A calendar and notes are maintained to aid memory. Fasting since midnight has been done in preparation for today's blood work.    Review of Systems      SDOH Never Smoker       Objective   Vitals:    04/11/25 0920   BP: 139/82  Comment: At home BP   Pulse: 82   Resp: 18   Weight: 92.7 kg (204 lb 5.9 oz)   Height: 5' 4\" (1.626 m)   PainSc:  0   BMI (Calculated): 35.08     Physical Exam        GENERAL APPEARANCE: Well appearing, no acute distress.    HEENT: Normal appearing conjunctiva, Nasal exam is within normal limits, There is no pharyngeal erythema. TMs are visualized and are normal.    NECK/THYROID: Neck is supple without cervical lymphadenopathy, No throid masses or tenderness.    CARDIOVASCULAR: regular rate and rhythm, normal S1S2, no murmurs.    RESPIRATORY: clear to auscultation and percussion, there is no wheezing.    ABDOMEN: soft, non-tender, non-distended, +Bowel sounds, No hepatomegaly.  LYMPHATIC: No Lymphadenopathy   EXT: no edema       ASSESSMENT AND PLAN        1. Health maintenance.  - Blood glucose levels have normalized at  91.  - Bone density test conducted a few years ago yielded satisfactory results.  - Last colonoscopy was performed in 2017, revealing a few polyps.  - Pulse rate is within normal limits.  - A bone density test will be scheduled for next year, marking 5 years since the last one.  - A blood test will be conducted today.    2. Cataract surgery.  - Had cataract surgery on one eye recently and is scheduled for the other eye in 2 weeks.  - Reports blurry vision with current prescription glasses and uses a cane to prevent falls.  - Using combination antibiotic eyedrops and lubricating drops for dry eyes as prescribed.    3. Cerumen impaction.  - Reports a plugged sensation in her ear, ongoing for months.  - Examination revealed approximately 70% blockage due to cerumen.  - Cerumen was successfully removed through irrigation today.  - If symptoms persist, further evaluation for sinus-related issues may be necessary.    4. Muscle cramps.  - Experiences muscle cramps from the knee to the hip, especially in the hip that was replaced in 10/2023.  - Finds relief from consuming mustard or pickle juice.  - Advised to continue these remedies and to take Advil, 2 to 3 tablets up to twice daily as needed, for pain management.    5. Ankle discoloration and swelling.  - Has discoloration and swelling in her ankles, worse on the right side post-surgery and mild swelling on the left.  - Swelling worsens in hot, humid weather.  - Advised to elevate her legs when resting and to monitor her salt intake to manage the swelling.    6. Elevated blood pressure.  - Blood pressure readings were slightly elevated during this visit at 156/82, but reports normal readings at home (138/80).  - Advised to continue monitoring blood pressure at home.    Follow-up  The patient will follow up in April 2026.    PROCEDURE  Procedure: Cerumen Removal from the Left Ear  After the risks, benefits, and alternatives to left sided ear irrigation was explained to  the patient, informed consent was obtained.  Warm water mixed with hydrogen peroxide was introduced into the ear canal on the left side.  Using an otoscope as an magnification tool, impacted cerumen was removed using a curette with minimal effort.  Total time for procedure was 10 minutes    1. Routine general medical examination at a health care facility  2. Essential hypertension, benign  -     Comprehensive Metabolic Panel  -     CBC with Automated Differential  -     Thyroid Stimulating Hormone Reflex  -     Lipid Panel Without Reflex  3. Abnormal blood chemistry  -     Glycohemoglobin  4. Class 2 severe obesity with body mass index (BMI) of 35 to 39.9 with serious comorbidity (CMD)  5. Impacted cerumen of left ear  Other orders  -     aspirin (ECOTRIN) 81 MG EC tablet; Take 1 tablet by mouth in the morning and 1 tablet in the evening.      Return in about 1 year (around 4/11/2026).          I have reviewed and edited the visit summary above and attest that it is accurate.    Trevon Wooten, DO

## 2025-05-27 ENCOUNTER — RX RENEWAL (OUTPATIENT)
Age: 85
End: 2025-05-27

## 2025-05-27 ENCOUNTER — NON-APPOINTMENT (OUTPATIENT)
Age: 85
End: 2025-05-27

## 2025-05-27 ENCOUNTER — APPOINTMENT (OUTPATIENT)
Dept: CARDIOLOGY | Facility: CLINIC | Age: 85
End: 2025-05-27
Payer: MEDICARE

## 2025-05-27 VITALS
SYSTOLIC BLOOD PRESSURE: 110 MMHG | HEART RATE: 59 BPM | WEIGHT: 133 LBS | DIASTOLIC BLOOD PRESSURE: 70 MMHG | BODY MASS INDEX: 23.56 KG/M2 | OXYGEN SATURATION: 96 %

## 2025-05-27 DIAGNOSIS — Z01.810 ENCOUNTER FOR PREPROCEDURAL CARDIOVASCULAR EXAMINATION: ICD-10-CM

## 2025-05-27 DIAGNOSIS — I10 ESSENTIAL (PRIMARY) HYPERTENSION: ICD-10-CM

## 2025-05-27 DIAGNOSIS — E78.5 HYPERLIPIDEMIA, UNSPECIFIED: ICD-10-CM

## 2025-05-27 DIAGNOSIS — E03.9 HYPOTHYROIDISM, UNSPECIFIED: ICD-10-CM

## 2025-05-27 PROCEDURE — G2211 COMPLEX E/M VISIT ADD ON: CPT

## 2025-05-27 PROCEDURE — 99214 OFFICE O/P EST MOD 30 MIN: CPT

## 2025-05-27 PROCEDURE — 93000 ELECTROCARDIOGRAM COMPLETE: CPT

## 2025-05-29 ENCOUNTER — NON-APPOINTMENT (OUTPATIENT)
Age: 85
End: 2025-05-29

## 2025-06-03 ENCOUNTER — OUTPATIENT (OUTPATIENT)
Dept: OUTPATIENT SERVICES | Facility: HOSPITAL | Age: 85
LOS: 1 days | End: 2025-06-03
Payer: MEDICARE

## 2025-06-03 ENCOUNTER — TRANSCRIPTION ENCOUNTER (OUTPATIENT)
Age: 85
End: 2025-06-03

## 2025-06-03 ENCOUNTER — RESULT REVIEW (OUTPATIENT)
Age: 85
End: 2025-06-03

## 2025-06-03 ENCOUNTER — APPOINTMENT (OUTPATIENT)
Dept: GASTROENTEROLOGY | Facility: HOSPITAL | Age: 85
End: 2025-06-03

## 2025-06-03 VITALS
HEART RATE: 60 BPM | SYSTOLIC BLOOD PRESSURE: 145 MMHG | RESPIRATION RATE: 12 BRPM | DIASTOLIC BLOOD PRESSURE: 85 MMHG | OXYGEN SATURATION: 100 %

## 2025-06-03 VITALS
OXYGEN SATURATION: 96 % | DIASTOLIC BLOOD PRESSURE: 77 MMHG | SYSTOLIC BLOOD PRESSURE: 135 MMHG | HEIGHT: 63 IN | HEART RATE: 58 BPM | WEIGHT: 126.99 LBS | TEMPERATURE: 98 F | RESPIRATION RATE: 15 BRPM

## 2025-06-03 DIAGNOSIS — Z96.651 PRESENCE OF RIGHT ARTIFICIAL KNEE JOINT: Chronic | ICD-10-CM

## 2025-06-03 DIAGNOSIS — Z96.642 PRESENCE OF LEFT ARTIFICIAL HIP JOINT: Chronic | ICD-10-CM

## 2025-06-03 DIAGNOSIS — Z98.890 OTHER SPECIFIED POSTPROCEDURAL STATES: Chronic | ICD-10-CM

## 2025-06-03 DIAGNOSIS — K22.70 BARRETT'S ESOPHAGUS WITHOUT DYSPLASIA: ICD-10-CM

## 2025-06-03 DIAGNOSIS — Z98.1 ARTHRODESIS STATUS: Chronic | ICD-10-CM

## 2025-06-03 PROCEDURE — 43239 EGD BIOPSY SINGLE/MULTIPLE: CPT | Mod: 59,GC

## 2025-06-03 PROCEDURE — 88305 TISSUE EXAM BY PATHOLOGIST: CPT | Mod: 26

## 2025-06-03 PROCEDURE — 43270 EGD LESION ABLATION: CPT | Mod: GC

## 2025-06-03 DEVICE — CATH ABLATION HALO 90DGR: Type: IMPLANTABLE DEVICE | Status: FUNCTIONAL

## 2025-06-03 RX ORDER — SODIUM CHLORIDE 9 G/1000ML
500 INJECTION, SOLUTION INTRAVENOUS
Refills: 0 | Status: COMPLETED | OUTPATIENT
Start: 2025-06-03 | End: 2025-06-03

## 2025-06-03 RX ORDER — FAMOTIDINE 40 MG/1
40 TABLET, FILM COATED ORAL
Qty: 30 | Refills: 2 | Status: ACTIVE | COMMUNITY
Start: 2025-06-03 | End: 1900-01-01

## 2025-06-03 RX ORDER — SUCRALFATE 1 G/1
1 TABLET ORAL 3 TIMES DAILY
Qty: 30 | Refills: 0 | Status: ACTIVE | COMMUNITY
Start: 2025-06-03 | End: 1900-01-01

## 2025-06-03 RX ADMIN — SODIUM CHLORIDE 30 MILLILITER(S): 9 INJECTION, SOLUTION INTRAVENOUS at 09:12

## 2025-06-03 NOTE — PRE PROCEDURE NOTE - PRE PROCEDURE EVALUATION
Attending Physician:  Dr. Henning    Procedure: EGD    Indication for Procedure: Mendoza's surveillance   ________________________________________________________  PAST MEDICAL & SURGICAL HISTORY:  History of Mendoza's esophagus  dx: ' 2012      Osteoarthritis      Anxiety and depression  medically managed. Never Hospitalized.      GERD (gastroesophageal reflux disease)      S/P appendectomy  age 8      Fracture  ' 80's    repair of right knee with pinning      H/O shoulder replacement  right 6/2012        Left ' 2017      H/O abdominoplasty  ' 80's      H/O bilateral breast reduction surgery  ' 80's      S/P cataract extraction  '10   Bilateral      S/P tonsillectomy  age 1      S/P cosmetic plastic surgery  Face      S/P total knee replacement, right      History of left hip replacement      S/P lumbar spinal fusion        ALLERGIES:  dust (Sneezing)  No Known Drug Allergies    HOME MEDICATIONS:  Allegra  180 milligrams: 1 dose(s) orally once a day (at bedtime)  ALPRAZolam 0.5 mg oral tablet: 1 tab(s) orally once a day (at bedtime)  amlodipine-benazepril 5 mg-10 mg oral capsule: 1 cap(s) orally once a day  buPROPion 150 mg/24 hours (XL) oral tablet, extended release: 1 tab(s) orally once a day (at bedtime)  Lexapro 20 mg oral tablet: 1 tab(s) orally once a day (at bedtime)  Lipitor 10 mg oral tablet: 1 tab(s) orally once a day  Ozempic 2 mg/3 mL (0.25 mg or 0.5 mg dose) subcutaneous solution: 0.5 milligram(s) subcutaneously every 7 days on tuesdays  pantoprazole 40 mg oral delayed release tablet: 1 tab(s) orally once a day (at bedtime)    AICD/PPM: [ ] yes   [X ] no      COMMENTS:    The patient is a suitable candidate for the planned procedure unless box checked [ ]  No, explain:

## 2025-06-03 NOTE — ASU DISCHARGE PLAN (ADULT/PEDIATRIC) - NS MD DC FALL RISK RISK
For information on Fall & Injury Prevention, visit: [email or web address] OR  [email or web address] OR  https://www.cdc.gov/steadi/patient.html

## 2025-06-03 NOTE — ASU DISCHARGE PLAN (ADULT/PEDIATRIC) - FINANCIAL ASSISTANCE
[facility] provides services at a reduced cost to those who are determined to be eligible through [facility]’s financial assistance program. Information regarding [facility]’s financial assistance program can be found by going to [email or web address] or by calling [phone].

## 2025-06-06 LAB — SURGICAL PATHOLOGY STUDY: SIGNIFICANT CHANGE UP

## 2025-06-09 ENCOUNTER — NON-APPOINTMENT (OUTPATIENT)
Age: 85
End: 2025-06-09

## 2025-06-10 ENCOUNTER — APPOINTMENT (OUTPATIENT)
Dept: INTERNAL MEDICINE | Facility: CLINIC | Age: 85
End: 2025-06-10
Payer: MEDICARE

## 2025-06-10 VITALS
DIASTOLIC BLOOD PRESSURE: 80 MMHG | HEIGHT: 63 IN | BODY MASS INDEX: 23.04 KG/M2 | RESPIRATION RATE: 14 BRPM | SYSTOLIC BLOOD PRESSURE: 130 MMHG | HEART RATE: 64 BPM | WEIGHT: 130 LBS

## 2025-06-10 PROCEDURE — G2211 COMPLEX E/M VISIT ADD ON: CPT

## 2025-06-10 PROCEDURE — 36415 COLL VENOUS BLD VENIPUNCTURE: CPT

## 2025-06-10 PROCEDURE — 99214 OFFICE O/P EST MOD 30 MIN: CPT

## 2025-06-12 LAB
ALBUMIN SERPL ELPH-MCNC: 4.1 G/DL
ALP BLD-CCNC: 63 U/L
ALT SERPL-CCNC: 24 U/L
ANION GAP SERPL CALC-SCNC: 18 MMOL/L
AST SERPL-CCNC: 29 U/L
BILIRUB SERPL-MCNC: 0.5 MG/DL
BUN SERPL-MCNC: 22 MG/DL
CALCIUM SERPL-MCNC: 9.4 MG/DL
CHLORIDE SERPL-SCNC: 102 MMOL/L
CHOLEST SERPL-MCNC: 209 MG/DL
CO2 SERPL-SCNC: 20 MMOL/L
CREAT SERPL-MCNC: 0.61 MG/DL
EGFRCR SERPLBLD CKD-EPI 2021: 88 ML/MIN/1.73M2
ESTIMATED AVERAGE GLUCOSE: 85 MG/DL
GLUCOSE SERPL-MCNC: 75 MG/DL
HBA1C MFR BLD HPLC: 4.6 %
HDLC SERPL-MCNC: 57 MG/DL
LDLC SERPL-MCNC: 132 MG/DL
NONHDLC SERPL-MCNC: 152 MG/DL
POTASSIUM SERPL-SCNC: 5.4 MMOL/L
PROT SERPL-MCNC: 6.3 G/DL
SODIUM SERPL-SCNC: 139 MMOL/L
T4 FREE SERPL-MCNC: 1.5 NG/DL
TRIGL SERPL-MCNC: 112 MG/DL
TSH SERPL-ACNC: 2.24 UIU/ML

## 2025-09-02 ENCOUNTER — APPOINTMENT (OUTPATIENT)
Dept: ORTHOPEDIC SURGERY | Facility: CLINIC | Age: 85
End: 2025-09-02

## 2025-09-05 ENCOUNTER — RX RENEWAL (OUTPATIENT)
Age: 85
End: 2025-09-05

## 2025-09-09 ENCOUNTER — APPOINTMENT (OUTPATIENT)
Dept: GASTROENTEROLOGY | Facility: HOSPITAL | Age: 85
End: 2025-09-09

## (undated) DEVICE — ATTACH DISTAL DISP

## (undated) DEVICE — TUBING MEDI-VAC W MAXIGRIP CONNECTORS 1/4"X6'

## (undated) DEVICE — WARMING BLANKET UPPER ADULT

## (undated) DEVICE — PACK IV START WITH CHG

## (undated) DEVICE — DRSG STERISTRIPS 0.5 X 4"

## (undated) DEVICE — DRSG CURITY GAUZE SPONGE 4 X 4" 12-PLY NON-STERILE

## (undated) DEVICE — CLAMP BX HOT RAD JAW 3

## (undated) DEVICE — BIOPSY FORCEP RADIAL JAW 4 STANDARD WITH NEEDLE

## (undated) DEVICE — FACESHIELD FULL VISOR

## (undated) DEVICE — CATH IV SAFE BC 22G X 1" (BLUE)

## (undated) DEVICE — BASIN EMESIS 10IN GRADUATED MAUVE

## (undated) DEVICE — LUBRICATING JELLY HR ONE SHOT 3G

## (undated) DEVICE — BITE BLOCK ADULT 20 X 27MM (GREEN)

## (undated) DEVICE — POSITIONER PATIENT SAFETY STRAP 3X60"

## (undated) DEVICE — GOWN LG

## (undated) DEVICE — PREP CHLORAPREP HI-LITE ORANGE 26ML

## (undated) DEVICE — ELCTR ECG CONDUCTIVE ADHESIVE

## (undated) DEVICE — SALIVA EJECTOR (BLUE)

## (undated) DEVICE — UNDERPAD LINEN SAVER 17 X 24"

## (undated) DEVICE — TUBING IV SET GRAVITY 3Y 100" MACRO

## (undated) DEVICE — CONTAINER FORMALIN 80ML YELLOW

## (undated) DEVICE — DRSG BANDAID 0.75X3"

## (undated) DEVICE — PACK ADULT HERNIA

## (undated) DEVICE — DRAPE TOWEL BLUE 17" X 24"

## (undated) DEVICE — DENTURE CUP PINK

## (undated) DEVICE — DRAIN PENROSE 0.5X12" SILICONE

## (undated) DEVICE — BIOPSY FORCEP COLD DISP

## (undated) DEVICE — LIGASURE SMALL JAW

## (undated) DEVICE — MEDICATION LABELS W MARKER

## (undated) DEVICE — SPECIMEN CONTAINER 100ML

## (undated) DEVICE — DRAPE INSTRUMENT POUCH 6.75" X 11"

## (undated) DEVICE — LINE BREATHE SAMPLNG

## (undated) DEVICE — SUT SURGIPRO 2-0 30" GS-22

## (undated) DEVICE — VENODYNE/SCD SLEEVE CALF MEDIUM

## (undated) DEVICE — DRSG 2X2

## (undated) DEVICE — BLADE SCALPEL SAFETYLOCK #15

## (undated) DEVICE — GLV 7.5 PROTEXIS (WHITE)

## (undated) DEVICE — SOL IRR POUR NS 0.9% 500ML

## (undated) DEVICE — SOL IRR POUR H2O 250ML

## (undated) DEVICE — SUT POLYSORB 2-0 18" TIES UNDYED

## (undated) DEVICE — FORCEP RADIAL JAW 4 W NDL 2.4MM 2.8MM 240CM ORANGE DISP

## (undated) DEVICE — SUT MONOCRYL 4-0 27" PS-2 UNDYED

## (undated) DEVICE — SUT POLYSORB 3-0 30" V-20 UNDYED